# Patient Record
Sex: FEMALE | Race: WHITE | Employment: UNEMPLOYED | URBAN - METROPOLITAN AREA
[De-identification: names, ages, dates, MRNs, and addresses within clinical notes are randomized per-mention and may not be internally consistent; named-entity substitution may affect disease eponyms.]

---

## 2020-01-01 ENCOUNTER — ANESTHESIA EVENT (OUTPATIENT)
Dept: SURGERY | Age: 55
End: 2020-01-01
Payer: COMMERCIAL

## 2020-01-01 ENCOUNTER — APPOINTMENT (OUTPATIENT)
Dept: GENERAL RADIOLOGY | Age: 55
End: 2020-01-01
Attending: INTERNAL MEDICINE

## 2020-01-01 ENCOUNTER — APPOINTMENT (OUTPATIENT)
Dept: ULTRASOUND IMAGING | Age: 55
End: 2020-01-01
Attending: INTERNAL MEDICINE

## 2020-01-01 ENCOUNTER — ANESTHESIA (OUTPATIENT)
Dept: SURGERY | Age: 55
End: 2020-01-01
Payer: COMMERCIAL

## 2020-01-01 ENCOUNTER — HOSPITAL ENCOUNTER (INPATIENT)
Age: 55
LOS: 1 days | End: 2020-10-13
Attending: INTERNAL MEDICINE | Admitting: INTERNAL MEDICINE

## 2020-01-01 ENCOUNTER — HOSPITAL ENCOUNTER (OUTPATIENT)
Age: 55
Discharge: HOME | End: 2020-10-13
Attending: INTERNAL MEDICINE | Admitting: INTERNAL MEDICINE

## 2020-01-01 ENCOUNTER — HOSPITAL ENCOUNTER (OUTPATIENT)
Age: 55
Setting detail: OUTPATIENT SURGERY
Discharge: OTHER INSTITUTION | End: 2020-09-18
Attending: SURGERY | Admitting: SURGERY
Payer: COMMERCIAL

## 2020-01-01 ENCOUNTER — HOSPICE ADMISSION (OUTPATIENT)
Dept: HOSPICE | Facility: HOSPICE | Age: 55
End: 2020-01-01
Payer: MEDICARE

## 2020-01-01 ENCOUNTER — APPOINTMENT (OUTPATIENT)
Dept: CT IMAGING | Age: 55
End: 2020-01-01
Attending: INTERNAL MEDICINE

## 2020-01-01 VITALS — HEART RATE: 111 BPM | DIASTOLIC BLOOD PRESSURE: 68 MMHG | TEMPERATURE: 97.6 F | SYSTOLIC BLOOD PRESSURE: 118 MMHG

## 2020-01-01 VITALS
HEIGHT: 68 IN | SYSTOLIC BLOOD PRESSURE: 123 MMHG | TEMPERATURE: 98.2 F | RESPIRATION RATE: 18 BRPM | HEART RATE: 94 BPM | WEIGHT: 170 LBS | OXYGEN SATURATION: 100 % | DIASTOLIC BLOOD PRESSURE: 52 MMHG | BODY MASS INDEX: 25.76 KG/M2

## 2020-01-01 DIAGNOSIS — T85.71XA INFECTION ASSOCIATED WITH PERITONEAL DIALYSIS CATHETER, INITIAL ENCOUNTER (HCC): ICD-10-CM

## 2020-01-01 DIAGNOSIS — N18.6 ESRD (END STAGE RENAL DISEASE) ON DIALYSIS (HCC): ICD-10-CM

## 2020-01-01 DIAGNOSIS — Z99.2 ESRD (END STAGE RENAL DISEASE) ON DIALYSIS (HCC): ICD-10-CM

## 2020-01-01 LAB
ABO + RH BLD: NORMAL
ALBUMIN SERPL-MCNC: 0.5 G/DL (ref 3.5–5)
ALBUMIN SERPL-MCNC: 0.6 G/DL (ref 3.5–5)
ALBUMIN SERPL-MCNC: 0.8 G/DL (ref 3.5–5)
ALBUMIN SERPL-MCNC: 1 G/DL (ref 3.5–5)
ALBUMIN SERPL-MCNC: 1.2 G/DL (ref 3.5–5)
ALBUMIN SERPL-MCNC: 1.3 G/DL (ref 3.5–5)
ALBUMIN SERPL-MCNC: 1.3 G/DL (ref 3.5–5)
ALBUMIN SERPL-MCNC: 1.4 G/DL (ref 3.5–5)
ALBUMIN/GLOB SERPL: 0.1 {RATIO} (ref 1.2–3.5)
ALBUMIN/GLOB SERPL: 0.1 {RATIO} (ref 1.2–3.5)
ALBUMIN/GLOB SERPL: 0.2 {RATIO} (ref 1.2–3.5)
ALBUMIN/GLOB SERPL: 0.3 {RATIO} (ref 1.2–3.5)
ALBUMIN/GLOB SERPL: 0.3 {RATIO} (ref 1.2–3.5)
ALBUMIN/GLOB SERPL: 0.4 {RATIO} (ref 1.2–3.5)
ALP SERPL-CCNC: 150 U/L (ref 50–136)
ALP SERPL-CCNC: 154 U/L (ref 50–136)
ALP SERPL-CCNC: 164 U/L (ref 50–136)
ALP SERPL-CCNC: 167 U/L (ref 50–136)
ALP SERPL-CCNC: 220 U/L (ref 50–136)
ALP SERPL-CCNC: 75 U/L (ref 50–136)
ALP SERPL-CCNC: 84 U/L (ref 50–136)
ALP SERPL-CCNC: 95 U/L (ref 50–136)
ALT SERPL-CCNC: 10 U/L (ref 12–65)
ALT SERPL-CCNC: 11 U/L (ref 12–65)
ALT SERPL-CCNC: 11 U/L (ref 12–65)
ALT SERPL-CCNC: 13 U/L (ref 12–65)
ALT SERPL-CCNC: 13 U/L (ref 12–65)
ALT SERPL-CCNC: 20 U/L (ref 12–65)
ALT SERPL-CCNC: 21 U/L (ref 12–65)
ALT SERPL-CCNC: 7 U/L (ref 12–65)
AMORPH CRY URNS QL MICRO: ABNORMAL
ANION GAP SERPL CALC-SCNC: 12 MMOL/L (ref 7–16)
ANION GAP SERPL CALC-SCNC: 15 MMOL/L (ref 7–16)
ANION GAP SERPL CALC-SCNC: 16 MMOL/L (ref 7–16)
ANION GAP SERPL CALC-SCNC: 17 MMOL/L (ref 7–16)
ANION GAP SERPL CALC-SCNC: 18 MMOL/L (ref 7–16)
ANION GAP SERPL CALC-SCNC: 19 MMOL/L (ref 7–16)
ANION GAP SERPL CALC-SCNC: 19 MMOL/L (ref 7–16)
ANION GAP SERPL CALC-SCNC: 20 MMOL/L (ref 7–16)
ANION GAP SERPL CALC-SCNC: 21 MMOL/L (ref 7–16)
ANION GAP SERPL CALC-SCNC: 22 MMOL/L (ref 7–16)
ANION GAP SERPL CALC-SCNC: 22 MMOL/L (ref 7–16)
ANION GAP SERPL CALC-SCNC: 7 MMOL/L (ref 7–16)
APPEARANCE FLD: NORMAL
APPEARANCE UR: ABNORMAL
APTT PPP: 103.3 SEC (ref 24.7–39.8)
APTT PPP: 119.7 SEC (ref 24.3–35.4)
APTT PPP: 198.4 SEC (ref 24.3–35.4)
APTT PPP: 42.3 SEC (ref 24.3–35.4)
APTT PPP: 48.2 SEC (ref 24.3–35.4)
APTT PPP: 60.8 SEC (ref 24.3–35.4)
AST SERPL-CCNC: 14 U/L (ref 15–37)
AST SERPL-CCNC: 17 U/L (ref 15–37)
AST SERPL-CCNC: 26 U/L (ref 15–37)
AST SERPL-CCNC: 27 U/L (ref 15–37)
AST SERPL-CCNC: 28 U/L (ref 15–37)
AST SERPL-CCNC: 29 U/L (ref 15–37)
AST SERPL-CCNC: 32 U/L (ref 15–37)
AST SERPL-CCNC: 40 U/L (ref 15–37)
BACTERIA SPEC CULT: NORMAL
BACTERIA URNS QL MICRO: ABNORMAL /HPF
BASOPHILS # BLD: 0 K/UL (ref 0–0.2)
BASOPHILS # BLD: 0.1 K/UL (ref 0–0.2)
BASOPHILS # BLD: 0.1 K/UL (ref 0–0.2)
BASOPHILS NFR BLD: 0 % (ref 0–2)
BASOPHILS NFR BLD: 1 % (ref 0–2)
BILIRUB SERPL-MCNC: 0.2 MG/DL (ref 0.2–1.1)
BILIRUB SERPL-MCNC: 0.3 MG/DL (ref 0.2–1.1)
BILIRUB SERPL-MCNC: 0.4 MG/DL (ref 0.2–1.1)
BILIRUB SERPL-MCNC: 0.6 MG/DL (ref 0.2–1.1)
BILIRUB SERPL-MCNC: 0.6 MG/DL (ref 0.2–1.1)
BILIRUB UR QL: NEGATIVE
BLD PROD TYP BPU: NORMAL
BLOOD BANK CMNT PATIENT-IMP: NORMAL
BLOOD GROUP ANTIBODIES SERPL: NORMAL
BPU ID: NORMAL
BUN SERPL-MCNC: 16 MG/DL (ref 6–23)
BUN SERPL-MCNC: 17 MG/DL (ref 6–23)
BUN SERPL-MCNC: 17 MG/DL (ref 6–23)
BUN SERPL-MCNC: 25 MG/DL (ref 6–23)
BUN SERPL-MCNC: 26 MG/DL (ref 6–23)
BUN SERPL-MCNC: 28 MG/DL (ref 6–23)
BUN SERPL-MCNC: 29 MG/DL (ref 6–23)
BUN SERPL-MCNC: 32 MG/DL (ref 6–23)
BUN SERPL-MCNC: 34 MG/DL (ref 6–23)
BUN SERPL-MCNC: 40 MG/DL (ref 6–23)
BUN SERPL-MCNC: 42 MG/DL (ref 6–23)
BUN SERPL-MCNC: 42 MG/DL (ref 6–23)
BUN SERPL-MCNC: 48 MG/DL (ref 6–23)
BUN SERPL-MCNC: 5 MG/DL (ref 6–23)
BUN SERPL-MCNC: 52 MG/DL (ref 6–23)
BUN SERPL-MCNC: 53 MG/DL (ref 6–23)
BUN SERPL-MCNC: 55 MG/DL (ref 6–23)
BUN SERPL-MCNC: 56 MG/DL (ref 6–23)
BUN SERPL-MCNC: 56 MG/DL (ref 6–23)
BUN SERPL-MCNC: 68 MG/DL (ref 6–23)
CA-I BLD-MCNC: 3.8 MG/DL (ref 4–5.2)
CA-I BLD-MCNC: 4.64 MG/DL (ref 4–5.2)
CALCIUM SERPL-MCNC: 10 MG/DL (ref 8.3–10.4)
CALCIUM SERPL-MCNC: 7.1 MG/DL (ref 8.3–10.4)
CALCIUM SERPL-MCNC: 7.2 MG/DL (ref 8.3–10.4)
CALCIUM SERPL-MCNC: 8.4 MG/DL (ref 8.3–10.4)
CALCIUM SERPL-MCNC: 8.4 MG/DL (ref 8.3–10.4)
CALCIUM SERPL-MCNC: 8.6 MG/DL (ref 8.3–10.4)
CALCIUM SERPL-MCNC: 8.7 MG/DL (ref 8.3–10.4)
CALCIUM SERPL-MCNC: 8.8 MG/DL (ref 8.3–10.4)
CALCIUM SERPL-MCNC: 8.8 MG/DL (ref 8.3–10.4)
CALCIUM SERPL-MCNC: 8.9 MG/DL (ref 8.3–10.4)
CALCIUM SERPL-MCNC: 9 MG/DL (ref 8.3–10.4)
CALCIUM SERPL-MCNC: 9 MG/DL (ref 8.3–10.4)
CALCIUM SERPL-MCNC: 9.2 MG/DL (ref 8.3–10.4)
CALCIUM SERPL-MCNC: 9.2 MG/DL (ref 8.3–10.4)
CALCIUM SERPL-MCNC: 9.4 MG/DL (ref 8.3–10.4)
CALCIUM SERPL-MCNC: 9.4 MG/DL (ref 8.3–10.4)
CALCIUM SERPL-MCNC: 9.5 MG/DL (ref 8.3–10.4)
CALCIUM SERPL-MCNC: 9.8 MG/DL (ref 8.3–10.4)
CALCIUM SERPL-MCNC: 9.9 MG/DL (ref 8.3–10.4)
CALCIUM SERPL-MCNC: 9.9 MG/DL (ref 8.3–10.4)
CHLORIDE SERPL-SCNC: 100 MMOL/L (ref 98–107)
CHLORIDE SERPL-SCNC: 100 MMOL/L (ref 98–107)
CHLORIDE SERPL-SCNC: 101 MMOL/L (ref 98–107)
CHLORIDE SERPL-SCNC: 102 MMOL/L (ref 98–107)
CHLORIDE SERPL-SCNC: 103 MMOL/L (ref 98–107)
CHLORIDE SERPL-SCNC: 91 MMOL/L (ref 98–107)
CHLORIDE SERPL-SCNC: 92 MMOL/L (ref 98–107)
CHLORIDE SERPL-SCNC: 92 MMOL/L (ref 98–107)
CHLORIDE SERPL-SCNC: 93 MMOL/L (ref 98–107)
CHLORIDE SERPL-SCNC: 94 MMOL/L (ref 98–107)
CHLORIDE SERPL-SCNC: 95 MMOL/L (ref 98–107)
CHLORIDE SERPL-SCNC: 95 MMOL/L (ref 98–107)
CHLORIDE SERPL-SCNC: 96 MMOL/L (ref 98–107)
CHLORIDE SERPL-SCNC: 96 MMOL/L (ref 98–107)
CHLORIDE SERPL-SCNC: 97 MMOL/L (ref 98–107)
CHLORIDE SERPL-SCNC: 98 MMOL/L (ref 98–107)
CO2 SERPL-SCNC: 21 MMOL/L (ref 21–32)
CO2 SERPL-SCNC: 22 MMOL/L (ref 21–32)
CO2 SERPL-SCNC: 22 MMOL/L (ref 21–32)
CO2 SERPL-SCNC: 23 MMOL/L (ref 21–32)
CO2 SERPL-SCNC: 24 MMOL/L (ref 21–32)
CO2 SERPL-SCNC: 25 MMOL/L (ref 21–32)
CO2 SERPL-SCNC: 25 MMOL/L (ref 21–32)
CO2 SERPL-SCNC: 26 MMOL/L (ref 21–32)
CO2 SERPL-SCNC: 27 MMOL/L (ref 21–32)
CO2 SERPL-SCNC: 28 MMOL/L (ref 21–32)
CO2 SERPL-SCNC: 28 MMOL/L (ref 21–32)
CO2 SERPL-SCNC: 29 MMOL/L (ref 21–32)
CO2 SERPL-SCNC: 33 MMOL/L (ref 21–32)
COLOR FLD: YELLOW
COLOR UR: ABNORMAL
CREAT SERPL-MCNC: 2.41 MG/DL (ref 0.6–1)
CREAT SERPL-MCNC: 2.59 MG/DL (ref 0.6–1)
CREAT SERPL-MCNC: 2.69 MG/DL (ref 0.6–1)
CREAT SERPL-MCNC: 2.75 MG/DL (ref 0.6–1)
CREAT SERPL-MCNC: 2.79 MG/DL (ref 0.6–1)
CREAT SERPL-MCNC: 2.8 MG/DL (ref 0.6–1)
CREAT SERPL-MCNC: 2.91 MG/DL (ref 0.6–1)
CREAT SERPL-MCNC: 3.11 MG/DL (ref 0.6–1)
CREAT SERPL-MCNC: 3.14 MG/DL (ref 0.6–1)
CREAT SERPL-MCNC: 3.15 MG/DL (ref 0.6–1)
CREAT SERPL-MCNC: 3.18 MG/DL (ref 0.6–1)
CREAT SERPL-MCNC: 3.46 MG/DL (ref 0.6–1)
CREAT SERPL-MCNC: 3.62 MG/DL (ref 0.6–1)
CREAT SERPL-MCNC: 3.73 MG/DL (ref 0.6–1)
CREAT SERPL-MCNC: 3.77 MG/DL (ref 0.6–1)
CREAT SERPL-MCNC: 4.09 MG/DL (ref 0.6–1)
CREAT SERPL-MCNC: 4.46 MG/DL (ref 0.6–1)
CREAT SERPL-MCNC: 4.64 MG/DL (ref 0.6–1)
CREAT SERPL-MCNC: 4.69 MG/DL (ref 0.6–1)
CREAT SERPL-MCNC: 5.04 MG/DL (ref 0.6–1)
CREAT SERPL-MCNC: 5.43 MG/DL (ref 0.6–1)
CREAT SERPL-MCNC: 6.03 MG/DL (ref 0.6–1)
CROSSMATCH RESULT,%XM: NORMAL
DIFFERENTIAL METHOD BLD: ABNORMAL
EOSINOPHIL # BLD: 0 K/UL (ref 0–0.8)
EOSINOPHIL # BLD: 0 K/UL (ref 0–0.8)
EOSINOPHIL # BLD: 0.1 K/UL (ref 0–0.8)
EOSINOPHIL # BLD: 0.1 K/UL (ref 0–0.8)
EOSINOPHIL # BLD: 0.2 K/UL (ref 0–0.8)
EOSINOPHIL # BLD: 0.3 K/UL (ref 0–0.8)
EOSINOPHIL NFR BLD: 0 % (ref 0.5–7.8)
EOSINOPHIL NFR BLD: 0 % (ref 0.5–7.8)
EOSINOPHIL NFR BLD: 1 % (ref 0.5–7.8)
EOSINOPHIL NFR BLD: 2 % (ref 0.5–7.8)
EPI CELLS #/AREA URNS HPF: ABNORMAL /HPF
ERYTHROCYTE [DISTWIDTH] IN BLOOD BY AUTOMATED COUNT: 15.2 % (ref 11.9–14.6)
ERYTHROCYTE [DISTWIDTH] IN BLOOD BY AUTOMATED COUNT: 15.3 % (ref 11.9–14.6)
ERYTHROCYTE [DISTWIDTH] IN BLOOD BY AUTOMATED COUNT: 15.5 % (ref 11.9–14.6)
ERYTHROCYTE [DISTWIDTH] IN BLOOD BY AUTOMATED COUNT: 15.6 % (ref 11.9–14.6)
ERYTHROCYTE [DISTWIDTH] IN BLOOD BY AUTOMATED COUNT: 15.7 % (ref 11.9–14.6)
ERYTHROCYTE [DISTWIDTH] IN BLOOD BY AUTOMATED COUNT: 15.7 % (ref 11.9–14.6)
ERYTHROCYTE [DISTWIDTH] IN BLOOD BY AUTOMATED COUNT: 15.8 % (ref 11.9–14.6)
ERYTHROCYTE [DISTWIDTH] IN BLOOD BY AUTOMATED COUNT: 15.9 % (ref 11.9–14.6)
ERYTHROCYTE [DISTWIDTH] IN BLOOD BY AUTOMATED COUNT: 16 % (ref 11.9–14.6)
ERYTHROCYTE [DISTWIDTH] IN BLOOD BY AUTOMATED COUNT: 16.1 % (ref 11.9–14.6)
ERYTHROCYTE [DISTWIDTH] IN BLOOD BY AUTOMATED COUNT: 16.2 % (ref 11.9–14.6)
FOLATE SERPL-MCNC: 20 NG/ML (ref 3.1–17.5)
GLOBULIN SER CALC-MCNC: 3.8 G/DL (ref 2.3–3.5)
GLOBULIN SER CALC-MCNC: 4 G/DL (ref 2.3–3.5)
GLOBULIN SER CALC-MCNC: 4 G/DL (ref 2.3–3.5)
GLOBULIN SER CALC-MCNC: 4.2 G/DL (ref 2.3–3.5)
GLOBULIN SER CALC-MCNC: 4.4 G/DL (ref 2.3–3.5)
GLOBULIN SER CALC-MCNC: 4.6 G/DL (ref 2.3–3.5)
GLOBULIN SER CALC-MCNC: 4.8 G/DL (ref 2.3–3.5)
GLOBULIN SER CALC-MCNC: 4.8 G/DL (ref 2.3–3.5)
GLUCOSE SERPL-MCNC: 132 MG/DL (ref 65–100)
GLUCOSE SERPL-MCNC: 134 MG/DL (ref 65–100)
GLUCOSE SERPL-MCNC: 148 MG/DL (ref 65–100)
GLUCOSE SERPL-MCNC: 153 MG/DL (ref 65–100)
GLUCOSE SERPL-MCNC: 170 MG/DL (ref 65–100)
GLUCOSE SERPL-MCNC: 175 MG/DL (ref 65–100)
GLUCOSE SERPL-MCNC: 176 MG/DL (ref 65–100)
GLUCOSE SERPL-MCNC: 181 MG/DL (ref 65–100)
GLUCOSE SERPL-MCNC: 182 MG/DL (ref 65–100)
GLUCOSE SERPL-MCNC: 199 MG/DL (ref 65–100)
GLUCOSE SERPL-MCNC: 199 MG/DL (ref 65–100)
GLUCOSE SERPL-MCNC: 204 MG/DL (ref 65–100)
GLUCOSE SERPL-MCNC: 214 MG/DL (ref 65–100)
GLUCOSE SERPL-MCNC: 217 MG/DL (ref 65–100)
GLUCOSE SERPL-MCNC: 238 MG/DL (ref 65–100)
GLUCOSE SERPL-MCNC: 242 MG/DL (ref 65–100)
GLUCOSE SERPL-MCNC: 262 MG/DL (ref 65–100)
GLUCOSE SERPL-MCNC: 280 MG/DL (ref 65–100)
GLUCOSE SERPL-MCNC: 63 MG/DL (ref 65–100)
GLUCOSE SERPL-MCNC: 70 MG/DL (ref 65–100)
GLUCOSE SERPL-MCNC: 72 MG/DL (ref 65–100)
GLUCOSE SERPL-MCNC: 73 MG/DL (ref 65–100)
GLUCOSE UR STRIP.AUTO-MCNC: NEGATIVE MG/DL
GRAM STN SPEC: NORMAL
GRAM STN SPEC: NORMAL
HAV IGM SER QL: NONREACTIVE
HBV CORE AB SERPL QL IA: NEGATIVE
HBV CORE IGM SER QL: NONREACTIVE
HBV SURFACE AB SERPL IA-ACNC: 23.82 MIU/ML
HBV SURFACE AB SERPL IA-ACNC: 56.06 MIU/ML
HBV SURFACE AG SER QL: NONREACTIVE
HCT VFR BLD AUTO: 17.9 % (ref 35.8–46.3)
HCT VFR BLD AUTO: 19.4 % (ref 35.8–46.3)
HCT VFR BLD AUTO: 19.4 % (ref 35.8–46.3)
HCT VFR BLD AUTO: 19.7 % (ref 35.8–46.3)
HCT VFR BLD AUTO: 21.1 % (ref 35.8–46.3)
HCT VFR BLD AUTO: 21.1 % (ref 35.8–46.3)
HCT VFR BLD AUTO: 21.5 % (ref 35.8–46.3)
HCT VFR BLD AUTO: 22 % (ref 35.8–46.3)
HCT VFR BLD AUTO: 22 % (ref 35.8–46.3)
HCT VFR BLD AUTO: 22.2 % (ref 35.8–46.3)
HCT VFR BLD AUTO: 22.4 % (ref 35.8–46.3)
HCT VFR BLD AUTO: 22.7 % (ref 35.8–46.3)
HCT VFR BLD AUTO: 22.7 % (ref 35.8–46.3)
HCT VFR BLD AUTO: 22.8 % (ref 35.8–46.3)
HCT VFR BLD AUTO: 23 % (ref 35.8–46.3)
HCT VFR BLD AUTO: 23.1 % (ref 35.8–46.3)
HCT VFR BLD AUTO: 23.2 % (ref 35.8–46.3)
HCT VFR BLD AUTO: 23.2 % (ref 35.8–46.3)
HCT VFR BLD AUTO: 23.5 % (ref 35.8–46.3)
HCT VFR BLD AUTO: 23.7 % (ref 35.8–46.3)
HCT VFR BLD AUTO: 23.8 % (ref 35.8–46.3)
HCT VFR BLD AUTO: 23.9 % (ref 35.8–46.3)
HCT VFR BLD AUTO: 24.2 % (ref 35.8–46.3)
HCT VFR BLD AUTO: 24.5 % (ref 35.8–46.3)
HCT VFR BLD AUTO: 24.5 % (ref 35.8–46.3)
HCT VFR BLD AUTO: 25.1 % (ref 35.8–46.3)
HCT VFR BLD AUTO: 25.2 % (ref 35.8–46.3)
HCT VFR BLD AUTO: 25.2 % (ref 35.8–46.3)
HCT VFR BLD AUTO: 25.5 % (ref 35.8–46.3)
HCT VFR BLD AUTO: 25.6 % (ref 35.8–46.3)
HCT VFR BLD AUTO: 26.1 % (ref 35.8–46.3)
HCT VFR BLD AUTO: 27.6 % (ref 35.8–46.3)
HCT VFR BLD AUTO: 27.9 % (ref 35.8–46.3)
HCT VFR BLD AUTO: 30.1 % (ref 35.8–46.3)
HCV AB SER QL: NONREACTIVE
HEMOCCULT STL QL: NEGATIVE
HGB BLD-MCNC: 10.2 G/DL (ref 11.7–15.4)
HGB BLD-MCNC: 5.9 G/DL (ref 11.7–15.4)
HGB BLD-MCNC: 6.5 G/DL (ref 11.7–15.4)
HGB BLD-MCNC: 6.5 G/DL (ref 11.7–15.4)
HGB BLD-MCNC: 6.6 G/DL (ref 11.7–15.4)
HGB BLD-MCNC: 6.8 G/DL (ref 11.7–15.4)
HGB BLD-MCNC: 7 G/DL (ref 11.7–15.4)
HGB BLD-MCNC: 7.1 G/DL (ref 11.7–15.4)
HGB BLD-MCNC: 7.2 G/DL (ref 11.7–15.4)
HGB BLD-MCNC: 7.5 G/DL (ref 11.7–15.4)
HGB BLD-MCNC: 7.6 G/DL (ref 11.7–15.4)
HGB BLD-MCNC: 7.7 G/DL (ref 11.7–15.4)
HGB BLD-MCNC: 7.8 G/DL (ref 11.7–15.4)
HGB BLD-MCNC: 7.9 G/DL (ref 11.7–15.4)
HGB BLD-MCNC: 8 G/DL (ref 11.7–15.4)
HGB BLD-MCNC: 8 G/DL (ref 11.7–15.4)
HGB BLD-MCNC: 8.1 G/DL (ref 11.7–15.4)
HGB BLD-MCNC: 8.1 G/DL (ref 11.7–15.4)
HGB BLD-MCNC: 8.2 G/DL (ref 11.7–15.4)
HGB BLD-MCNC: 8.3 G/DL (ref 11.7–15.4)
HGB BLD-MCNC: 8.3 G/DL (ref 11.7–15.4)
HGB BLD-MCNC: 8.4 G/DL (ref 11.7–15.4)
HGB BLD-MCNC: 8.5 G/DL (ref 11.7–15.4)
HGB BLD-MCNC: 8.7 G/DL (ref 11.7–15.4)
HGB BLD-MCNC: 9 G/DL (ref 11.7–15.4)
HGB BLD-MCNC: 9.1 G/DL (ref 11.7–15.4)
HGB UR QL STRIP: ABNORMAL
HISTORY CHECKED?,CKHIST: NORMAL
IMM GRANULOCYTES # BLD AUTO: 0.1 K/UL (ref 0–0.5)
IMM GRANULOCYTES # BLD AUTO: 0.2 K/UL (ref 0–0.5)
IMM GRANULOCYTES # BLD AUTO: 0.3 K/UL (ref 0–0.5)
IMM GRANULOCYTES # BLD AUTO: 0.5 K/UL (ref 0–0.5)
IMM GRANULOCYTES # BLD AUTO: 0.6 K/UL (ref 0–0.5)
IMM GRANULOCYTES NFR BLD AUTO: 1 % (ref 0–5)
IMM GRANULOCYTES NFR BLD AUTO: 2 % (ref 0–5)
IMM GRANULOCYTES NFR BLD AUTO: 2 % (ref 0–5)
IMM GRANULOCYTES NFR BLD AUTO: 3 % (ref 0–5)
INR PPP: 1.2
INR PPP: 1.3
INR PPP: 1.5
INR PPP: 1.6
INR PPP: 1.7
INR PPP: 1.8
INR PPP: 1.9
INR PPP: 1.9
INR PPP: 2.2
KETONES UR QL STRIP.AUTO: NEGATIVE MG/DL
LACTATE SERPL-SCNC: 1.2 MMOL/L (ref 0.4–2)
LEUKOCYTE ESTERASE UR QL STRIP.AUTO: ABNORMAL
LYMPHOCYTES # BLD: 0.7 K/UL (ref 0.5–4.6)
LYMPHOCYTES # BLD: 1 K/UL (ref 0.5–4.6)
LYMPHOCYTES # BLD: 1.1 K/UL (ref 0.5–4.6)
LYMPHOCYTES # BLD: 1.1 K/UL (ref 0.5–4.6)
LYMPHOCYTES # BLD: 1.2 K/UL (ref 0.5–4.6)
LYMPHOCYTES # BLD: 1.2 K/UL (ref 0.5–4.6)
LYMPHOCYTES # BLD: 1.3 K/UL (ref 0.5–4.6)
LYMPHOCYTES # BLD: 1.4 K/UL (ref 0.5–4.6)
LYMPHOCYTES # BLD: 1.4 K/UL (ref 0.5–4.6)
LYMPHOCYTES # BLD: 1.5 K/UL (ref 0.5–4.6)
LYMPHOCYTES NFR BLD: 10 % (ref 13–44)
LYMPHOCYTES NFR BLD: 13 % (ref 13–44)
LYMPHOCYTES NFR BLD: 13 % (ref 13–44)
LYMPHOCYTES NFR BLD: 3 % (ref 13–44)
LYMPHOCYTES NFR BLD: 5 % (ref 13–44)
LYMPHOCYTES NFR BLD: 6 % (ref 13–44)
LYMPHOCYTES NFR BLD: 7 % (ref 13–44)
LYMPHOCYTES NFR BLD: 7 % (ref 13–44)
LYMPHOCYTES NFR BLD: 8 % (ref 13–44)
LYMPHOCYTES NFR BLD: 9 % (ref 13–44)
MAGNESIUM SERPL-MCNC: 1.7 MG/DL (ref 1.8–2.4)
MAGNESIUM SERPL-MCNC: 1.8 MG/DL (ref 1.8–2.4)
MAGNESIUM SERPL-MCNC: 1.9 MG/DL (ref 1.8–2.4)
MAGNESIUM SERPL-MCNC: 2.1 MG/DL (ref 1.8–2.4)
MAGNESIUM SERPL-MCNC: 2.2 MG/DL (ref 1.8–2.4)
MCH RBC QN AUTO: 29.7 PG (ref 26.1–32.9)
MCH RBC QN AUTO: 29.9 PG (ref 26.1–32.9)
MCH RBC QN AUTO: 30.3 PG (ref 26.1–32.9)
MCH RBC QN AUTO: 30.3 PG (ref 26.1–32.9)
MCH RBC QN AUTO: 30.4 PG (ref 26.1–32.9)
MCH RBC QN AUTO: 30.4 PG (ref 26.1–32.9)
MCH RBC QN AUTO: 30.5 PG (ref 26.1–32.9)
MCH RBC QN AUTO: 30.6 PG (ref 26.1–32.9)
MCH RBC QN AUTO: 30.7 PG (ref 26.1–32.9)
MCH RBC QN AUTO: 30.8 PG (ref 26.1–32.9)
MCH RBC QN AUTO: 30.9 PG (ref 26.1–32.9)
MCH RBC QN AUTO: 30.9 PG (ref 26.1–32.9)
MCH RBC QN AUTO: 31 PG (ref 26.1–32.9)
MCH RBC QN AUTO: 31.1 PG (ref 26.1–32.9)
MCH RBC QN AUTO: 31.2 PG (ref 26.1–32.9)
MCH RBC QN AUTO: 31.4 PG (ref 26.1–32.9)
MCH RBC QN AUTO: 31.5 PG (ref 26.1–32.9)
MCHC RBC AUTO-ENTMCNC: 30.6 G/DL (ref 31.4–35)
MCHC RBC AUTO-ENTMCNC: 31 G/DL (ref 31.4–35)
MCHC RBC AUTO-ENTMCNC: 31.3 G/DL (ref 31.4–35)
MCHC RBC AUTO-ENTMCNC: 31.5 G/DL (ref 31.4–35)
MCHC RBC AUTO-ENTMCNC: 31.7 G/DL (ref 31.4–35)
MCHC RBC AUTO-ENTMCNC: 31.8 G/DL (ref 31.4–35)
MCHC RBC AUTO-ENTMCNC: 31.9 G/DL (ref 31.4–35)
MCHC RBC AUTO-ENTMCNC: 32.7 G/DL (ref 31.4–35)
MCHC RBC AUTO-ENTMCNC: 32.7 G/DL (ref 31.4–35)
MCHC RBC AUTO-ENTMCNC: 32.8 G/DL (ref 31.4–35)
MCHC RBC AUTO-ENTMCNC: 32.9 G/DL (ref 31.4–35)
MCHC RBC AUTO-ENTMCNC: 33 G/DL (ref 31.4–35)
MCHC RBC AUTO-ENTMCNC: 33 G/DL (ref 31.4–35)
MCHC RBC AUTO-ENTMCNC: 33.2 G/DL (ref 31.4–35)
MCHC RBC AUTO-ENTMCNC: 33.3 G/DL (ref 31.4–35)
MCHC RBC AUTO-ENTMCNC: 33.5 G/DL (ref 31.4–35)
MCHC RBC AUTO-ENTMCNC: 33.8 G/DL (ref 31.4–35)
MCHC RBC AUTO-ENTMCNC: 33.8 G/DL (ref 31.4–35)
MCHC RBC AUTO-ENTMCNC: 33.9 G/DL (ref 31.4–35)
MCHC RBC AUTO-ENTMCNC: 34 G/DL (ref 31.4–35)
MCHC RBC AUTO-ENTMCNC: 34.1 G/DL (ref 31.4–35)
MCHC RBC AUTO-ENTMCNC: 34.1 G/DL (ref 31.4–35)
MCHC RBC AUTO-ENTMCNC: 34.3 G/DL (ref 31.4–35)
MCHC RBC AUTO-ENTMCNC: 34.6 G/DL (ref 31.4–35)
MCHC RBC AUTO-ENTMCNC: 34.9 G/DL (ref 31.4–35)
MCV RBC AUTO: 89.2 FL (ref 79.6–97.8)
MCV RBC AUTO: 89.4 FL (ref 79.6–97.8)
MCV RBC AUTO: 89.4 FL (ref 79.6–97.8)
MCV RBC AUTO: 89.8 FL (ref 79.6–97.8)
MCV RBC AUTO: 90.2 FL (ref 79.6–97.8)
MCV RBC AUTO: 90.3 FL (ref 79.6–97.8)
MCV RBC AUTO: 90.5 FL (ref 79.6–97.8)
MCV RBC AUTO: 90.9 FL (ref 79.6–97.8)
MCV RBC AUTO: 91.5 FL (ref 79.6–97.8)
MCV RBC AUTO: 91.7 FL (ref 79.6–97.8)
MCV RBC AUTO: 92.3 FL (ref 79.6–97.8)
MCV RBC AUTO: 92.4 FL (ref 79.6–97.8)
MCV RBC AUTO: 92.5 FL (ref 79.6–97.8)
MCV RBC AUTO: 92.5 FL (ref 79.6–97.8)
MCV RBC AUTO: 92.8 FL (ref 79.6–97.8)
MCV RBC AUTO: 92.8 FL (ref 79.6–97.8)
MCV RBC AUTO: 93.2 FL (ref 79.6–97.8)
MCV RBC AUTO: 93.3 FL (ref 79.6–97.8)
MCV RBC AUTO: 93.9 FL (ref 79.6–97.8)
MCV RBC AUTO: 95.7 FL (ref 79.6–97.8)
MCV RBC AUTO: 97.2 FL (ref 79.6–97.8)
MCV RBC AUTO: 97.7 FL (ref 79.6–97.8)
MCV RBC AUTO: 98.5 FL (ref 79.6–97.8)
MCV RBC AUTO: 99.6 FL (ref 79.6–97.8)
MCV RBC AUTO: 99.6 FL (ref 79.6–97.8)
MONOCYTES # BLD: 0.6 K/UL (ref 0.1–1.3)
MONOCYTES # BLD: 0.7 K/UL (ref 0.1–1.3)
MONOCYTES # BLD: 0.7 K/UL (ref 0.1–1.3)
MONOCYTES # BLD: 0.8 K/UL (ref 0.1–1.3)
MONOCYTES # BLD: 0.8 K/UL (ref 0.1–1.3)
MONOCYTES # BLD: 0.9 K/UL (ref 0.1–1.3)
MONOCYTES # BLD: 1 K/UL (ref 0.1–1.3)
MONOCYTES # BLD: 1.1 K/UL (ref 0.1–1.3)
MONOCYTES # BLD: 1.1 K/UL (ref 0.1–1.3)
MONOCYTES # BLD: 1.2 K/UL (ref 0.1–1.3)
MONOCYTES # BLD: 1.3 K/UL (ref 0.1–1.3)
MONOCYTES # BLD: 1.4 K/UL (ref 0.1–1.3)
MONOCYTES NFR BLD: 10 % (ref 4–12)
MONOCYTES NFR BLD: 4 % (ref 4–12)
MONOCYTES NFR BLD: 5 % (ref 4–12)
MONOCYTES NFR BLD: 6 % (ref 4–12)
MONOCYTES NFR BLD: 6 % (ref 4–12)
MONOCYTES NFR BLD: 7 % (ref 4–12)
MONOCYTES NFR BLD: 7 % (ref 4–12)
MONOCYTES NFR BLD: 8 % (ref 4–12)
MONOCYTES NFR BLD: 9 % (ref 4–12)
NEUTROPHILS NFR BRONCH MANUAL: 100 %
NEUTS SEG # BLD: 10.3 K/UL (ref 1.7–8.2)
NEUTS SEG # BLD: 10.3 K/UL (ref 1.7–8.2)
NEUTS SEG # BLD: 10.6 K/UL (ref 1.7–8.2)
NEUTS SEG # BLD: 10.7 K/UL (ref 1.7–8.2)
NEUTS SEG # BLD: 11.3 K/UL (ref 1.7–8.2)
NEUTS SEG # BLD: 15 K/UL (ref 1.7–8.2)
NEUTS SEG # BLD: 16.1 K/UL (ref 1.7–8.2)
NEUTS SEG # BLD: 16.2 K/UL (ref 1.7–8.2)
NEUTS SEG # BLD: 18.9 K/UL (ref 1.7–8.2)
NEUTS SEG # BLD: 19.8 K/UL (ref 1.7–8.2)
NEUTS SEG # BLD: 20.9 K/UL (ref 1.7–8.2)
NEUTS SEG # BLD: 21 K/UL (ref 1.7–8.2)
NEUTS SEG # BLD: 8.6 K/UL (ref 1.7–8.2)
NEUTS SEG # BLD: 8.8 K/UL (ref 1.7–8.2)
NEUTS SEG NFR BLD: 77 % (ref 43–78)
NEUTS SEG NFR BLD: 78 % (ref 43–78)
NEUTS SEG NFR BLD: 78 % (ref 43–78)
NEUTS SEG NFR BLD: 79 % (ref 43–78)
NEUTS SEG NFR BLD: 82 % (ref 43–78)
NEUTS SEG NFR BLD: 82 % (ref 43–78)
NEUTS SEG NFR BLD: 84 % (ref 43–78)
NEUTS SEG NFR BLD: 85 % (ref 43–78)
NEUTS SEG NFR BLD: 86 % (ref 43–78)
NEUTS SEG NFR BLD: 87 % (ref 43–78)
NEUTS SEG NFR BLD: 88 % (ref 43–78)
NEUTS SEG NFR BLD: 88 % (ref 43–78)
NEUTS SEG NFR BLD: 89 % (ref 43–78)
NEUTS SEG NFR BLD: 90 % (ref 43–78)
NITRITE UR QL STRIP.AUTO: NEGATIVE
NRBC # BLD: 0 K/UL (ref 0–0.2)
NUC CELL # FLD: 7188 /CU MM
OTHER OBSERVATIONS,UCOM: ABNORMAL
PH UR STRIP: 7.5 [PH] (ref 5–9)
PHOSPHATE SERPL-MCNC: 0.6 MG/DL (ref 2.5–4.5)
PHOSPHATE SERPL-MCNC: 0.6 MG/DL (ref 2.5–4.5)
PHOSPHATE SERPL-MCNC: 0.8 MG/DL (ref 2.5–4.5)
PHOSPHATE SERPL-MCNC: 1.1 MG/DL (ref 2.5–4.5)
PHOSPHATE SERPL-MCNC: 2 MG/DL (ref 2.5–4.5)
PHOSPHATE SERPL-MCNC: 2.2 MG/DL (ref 2.5–4.5)
PHOSPHATE SERPL-MCNC: 2.3 MG/DL (ref 2.5–4.5)
PHOSPHATE SERPL-MCNC: 2.3 MG/DL (ref 2.5–4.5)
PHOSPHATE SERPL-MCNC: 2.5 MG/DL (ref 2.5–4.5)
PHOSPHATE SERPL-MCNC: 2.7 MG/DL (ref 2.5–4.5)
PHOSPHATE SERPL-MCNC: 2.9 MG/DL (ref 2.5–4.5)
PHOSPHATE SERPL-MCNC: 3 MG/DL (ref 2.5–4.5)
PHOSPHATE SERPL-MCNC: 3.8 MG/DL (ref 2.5–4.5)
PHOSPHATE SERPL-MCNC: 4.8 MG/DL (ref 2.5–4.5)
PLATELET # BLD AUTO: 149 K/UL (ref 150–450)
PLATELET # BLD AUTO: 160 K/UL (ref 150–450)
PLATELET # BLD AUTO: 162 K/UL (ref 150–450)
PLATELET # BLD AUTO: 166 K/UL (ref 150–450)
PLATELET # BLD AUTO: 169 K/UL (ref 150–450)
PLATELET # BLD AUTO: 185 K/UL (ref 150–450)
PLATELET # BLD AUTO: 189 K/UL (ref 150–450)
PLATELET # BLD AUTO: 190 K/UL (ref 150–450)
PLATELET # BLD AUTO: 206 K/UL (ref 150–450)
PLATELET # BLD AUTO: 218 K/UL (ref 150–450)
PLATELET # BLD AUTO: 221 K/UL (ref 150–450)
PLATELET # BLD AUTO: 228 K/UL (ref 150–450)
PLATELET # BLD AUTO: 233 K/UL (ref 150–450)
PLATELET # BLD AUTO: 234 K/UL (ref 150–450)
PLATELET # BLD AUTO: 239 K/UL (ref 150–450)
PLATELET # BLD AUTO: 245 K/UL (ref 150–450)
PLATELET # BLD AUTO: 257 K/UL (ref 150–450)
PLATELET # BLD AUTO: 271 K/UL (ref 150–450)
PLATELET # BLD AUTO: 282 K/UL (ref 150–450)
PLATELET # BLD AUTO: 291 K/UL (ref 150–450)
PLATELET # BLD AUTO: 297 K/UL (ref 150–450)
PLATELET # BLD AUTO: 301 K/UL (ref 150–450)
PLATELET # BLD AUTO: 312 K/UL (ref 150–450)
PLATELET # BLD AUTO: 317 K/UL (ref 150–450)
PLATELET # BLD AUTO: 333 K/UL (ref 150–450)
PMV BLD AUTO: 10.1 FL (ref 9.4–12.3)
PMV BLD AUTO: 10.1 FL (ref 9.4–12.3)
PMV BLD AUTO: 10.2 FL (ref 9.4–12.3)
PMV BLD AUTO: 10.2 FL (ref 9.4–12.3)
PMV BLD AUTO: 10.3 FL (ref 9.4–12.3)
PMV BLD AUTO: 10.3 FL (ref 9.4–12.3)
PMV BLD AUTO: 10.4 FL (ref 9.4–12.3)
PMV BLD AUTO: 10.5 FL (ref 9.4–12.3)
PMV BLD AUTO: 10.5 FL (ref 9.4–12.3)
PMV BLD AUTO: 10.6 FL (ref 9.4–12.3)
PMV BLD AUTO: 10.7 FL (ref 9.4–12.3)
PMV BLD AUTO: 10.8 FL (ref 9.4–12.3)
PMV BLD AUTO: 11 FL (ref 9.4–12.3)
PMV BLD AUTO: 11.1 FL (ref 9.4–12.3)
PMV BLD AUTO: 11.1 FL (ref 9.4–12.3)
PMV BLD AUTO: 9.7 FL (ref 9.4–12.3)
POTASSIUM SERPL-SCNC: 3.7 MMOL/L (ref 3.5–5.1)
POTASSIUM SERPL-SCNC: 3.7 MMOL/L (ref 3.5–5.1)
POTASSIUM SERPL-SCNC: 3.8 MMOL/L (ref 3.5–5.1)
POTASSIUM SERPL-SCNC: 3.8 MMOL/L (ref 3.5–5.1)
POTASSIUM SERPL-SCNC: 3.9 MMOL/L (ref 3.5–5.1)
POTASSIUM SERPL-SCNC: 4 MMOL/L (ref 3.5–5.1)
POTASSIUM SERPL-SCNC: 4 MMOL/L (ref 3.5–5.1)
POTASSIUM SERPL-SCNC: 4.1 MMOL/L (ref 3.5–5.1)
POTASSIUM SERPL-SCNC: 4.1 MMOL/L (ref 3.5–5.1)
POTASSIUM SERPL-SCNC: 4.2 MMOL/L (ref 3.5–5.1)
POTASSIUM SERPL-SCNC: 4.3 MMOL/L (ref 3.5–5.1)
POTASSIUM SERPL-SCNC: 4.4 MMOL/L (ref 3.5–5.1)
POTASSIUM SERPL-SCNC: 4.5 MMOL/L (ref 3.5–5.1)
POTASSIUM SERPL-SCNC: 4.5 MMOL/L (ref 3.5–5.1)
POTASSIUM SERPL-SCNC: 4.6 MMOL/L (ref 3.5–5.1)
POTASSIUM SERPL-SCNC: 4.7 MMOL/L (ref 3.5–5.1)
POTASSIUM SERPL-SCNC: 5.1 MMOL/L (ref 3.5–5.1)
PROCALCITONIN SERPL-MCNC: 2.18 NG/ML
PROT SERPL-MCNC: 4.4 G/DL (ref 6.3–8.2)
PROT SERPL-MCNC: 4.6 G/DL (ref 6.3–8.2)
PROT SERPL-MCNC: 5.1 G/DL (ref 6.3–8.2)
PROT SERPL-MCNC: 5.4 G/DL (ref 6.3–8.2)
PROT SERPL-MCNC: 5.4 G/DL (ref 6.3–8.2)
PROT SERPL-MCNC: 5.5 G/DL (ref 6.3–8.2)
PROT SERPL-MCNC: 5.6 G/DL (ref 6.3–8.2)
PROT SERPL-MCNC: 5.6 G/DL (ref 6.3–8.2)
PROT UR STRIP-MCNC: >1000 MG/DL
PROTHROMBIN TIME: 15.3 SEC (ref 12–14.7)
PROTHROMBIN TIME: 16.9 SEC (ref 12–14.7)
PROTHROMBIN TIME: 18.3 SEC (ref 12–14.7)
PROTHROMBIN TIME: 18.4 SEC (ref 12–14.7)
PROTHROMBIN TIME: 18.9 SEC (ref 12–14.7)
PROTHROMBIN TIME: 18.9 SEC (ref 12–14.7)
PROTHROMBIN TIME: 19.3 SEC (ref 12–14.7)
PROTHROMBIN TIME: 20 SEC (ref 12–14.7)
PROTHROMBIN TIME: 20.9 SEC (ref 12–14.7)
PROTHROMBIN TIME: 21 SEC (ref 12–14.7)
PROTHROMBIN TIME: 21.7 SEC (ref 12–14.7)
PROTHROMBIN TIME: 21.9 SEC (ref 12–14.7)
PROTHROMBIN TIME: 21.9 SEC (ref 12–14.7)
PROTHROMBIN TIME: 24.9 SEC (ref 12–14.7)
PTH-INTACT SERPL-MCNC: 183.2 PG/ML (ref 18.5–88)
PTH-INTACT SERPL-MCNC: 187.1 PG/ML (ref 18.5–88)
RBC # BLD AUTO: 1.92 M/UL (ref 4.05–5.2)
RBC # BLD AUTO: 2.12 M/UL (ref 4.05–5.2)
RBC # BLD AUTO: 2.13 M/UL (ref 4.05–5.2)
RBC # BLD AUTO: 2.23 M/UL (ref 4.05–5.2)
RBC # BLD AUTO: 2.28 M/UL (ref 4.05–5.2)
RBC # BLD AUTO: 2.34 M/UL (ref 4.05–5.2)
RBC # BLD AUTO: 2.35 M/UL (ref 4.05–5.2)
RBC # BLD AUTO: 2.37 M/UL (ref 4.05–5.2)
RBC # BLD AUTO: 2.41 M/UL (ref 4.05–5.2)
RBC # BLD AUTO: 2.42 M/UL (ref 4.05–5.2)
RBC # BLD AUTO: 2.47 M/UL (ref 4.05–5.2)
RBC # BLD AUTO: 2.49 M/UL (ref 4.05–5.2)
RBC # BLD AUTO: 2.5 M/UL (ref 4.05–5.2)
RBC # BLD AUTO: 2.52 M/UL (ref 4.05–5.2)
RBC # BLD AUTO: 2.52 M/UL (ref 4.05–5.2)
RBC # BLD AUTO: 2.58 M/UL (ref 4.05–5.2)
RBC # BLD AUTO: 2.59 M/UL (ref 4.05–5.2)
RBC # BLD AUTO: 2.64 M/UL (ref 4.05–5.2)
RBC # BLD AUTO: 2.65 M/UL (ref 4.05–5.2)
RBC # BLD AUTO: 2.65 M/UL (ref 4.05–5.2)
RBC # BLD AUTO: 2.69 M/UL (ref 4.05–5.2)
RBC # BLD AUTO: 2.73 M/UL (ref 4.05–5.2)
RBC # BLD AUTO: 2.74 M/UL (ref 4.05–5.2)
RBC # BLD AUTO: 2.77 M/UL (ref 4.05–5.2)
RBC # BLD AUTO: 2.89 M/UL (ref 4.05–5.2)
RBC # FLD: 1000 /CU MM
RBC #/AREA URNS HPF: ABNORMAL /HPF
SERVICE CMNT-IMP: NORMAL
SODIUM SERPL-SCNC: 133 MMOL/L (ref 136–145)
SODIUM SERPL-SCNC: 135 MMOL/L (ref 136–145)
SODIUM SERPL-SCNC: 135 MMOL/L (ref 136–145)
SODIUM SERPL-SCNC: 137 MMOL/L (ref 136–145)
SODIUM SERPL-SCNC: 138 MMOL/L (ref 136–145)
SODIUM SERPL-SCNC: 139 MMOL/L (ref 136–145)
SODIUM SERPL-SCNC: 140 MMOL/L (ref 136–145)
SODIUM SERPL-SCNC: 141 MMOL/L (ref 136–145)
SODIUM SERPL-SCNC: 141 MMOL/L (ref 136–145)
SODIUM SERPL-SCNC: 143 MMOL/L (ref 136–145)
SODIUM SERPL-SCNC: 144 MMOL/L (ref 136–145)
SODIUM SERPL-SCNC: 145 MMOL/L (ref 136–145)
SP GR UR REFRACTOMETRY: 1.02 (ref 1–1.02)
SPECIMEN EXP DATE BLD: NORMAL
SPECIMEN SOURCE FLD: NORMAL
STATUS OF UNIT,%ST: NORMAL
T4 SERPL-MCNC: 6.6 UG/DL (ref 4.8–13.9)
TSH SERPL DL<=0.005 MIU/L-ACNC: 3.32 UIU/ML (ref 0.36–3.74)
UNIT DIVISION, %UDIV: 0
UROBILINOGEN UR QL STRIP.AUTO: 0.2 EU/DL (ref 0.2–1)
VANCOMYCIN SERPL-MCNC: 14.9 UG/ML
VANCOMYCIN SERPL-MCNC: 15.5 UG/ML
VANCOMYCIN SERPL-MCNC: 15.7 UG/ML
VANCOMYCIN SERPL-MCNC: 16.2 UG/ML
VANCOMYCIN SERPL-MCNC: 16.3 UG/ML
VANCOMYCIN SERPL-MCNC: 16.6 UG/ML
VANCOMYCIN SERPL-MCNC: 19.2 UG/ML
VANCOMYCIN SERPL-MCNC: 23.6 UG/ML
VANCOMYCIN SERPL-MCNC: 24.4 UG/ML
VANCOMYCIN SERPL-MCNC: 8.6 UG/ML
VIT B12 SERPL-MCNC: 1361 PG/ML (ref 193–986)
WBC # BLD AUTO: 10.9 K/UL (ref 4.3–11.1)
WBC # BLD AUTO: 11.4 K/UL (ref 4.3–11.1)
WBC # BLD AUTO: 11.9 K/UL (ref 4.3–11.1)
WBC # BLD AUTO: 12 K/UL (ref 4.3–11.1)
WBC # BLD AUTO: 12.2 K/UL (ref 4.3–11.1)
WBC # BLD AUTO: 12.5 K/UL (ref 4.3–11.1)
WBC # BLD AUTO: 12.9 K/UL (ref 4.3–11.1)
WBC # BLD AUTO: 13.5 K/UL (ref 4.3–11.1)
WBC # BLD AUTO: 13.6 K/UL (ref 4.3–11.1)
WBC # BLD AUTO: 13.8 K/UL (ref 4.3–11.1)
WBC # BLD AUTO: 16.1 K/UL (ref 4.3–11.1)
WBC # BLD AUTO: 17.2 K/UL (ref 4.3–11.1)
WBC # BLD AUTO: 17.5 K/UL (ref 4.3–11.1)
WBC # BLD AUTO: 18.2 K/UL (ref 4.3–11.1)
WBC # BLD AUTO: 18.6 K/UL (ref 4.3–11.1)
WBC # BLD AUTO: 19 K/UL (ref 4.3–11.1)
WBC # BLD AUTO: 19.8 K/UL (ref 4.3–11.1)
WBC # BLD AUTO: 21.8 K/UL (ref 4.3–11.1)
WBC # BLD AUTO: 22 K/UL (ref 4.3–11.1)
WBC # BLD AUTO: 22.1 K/UL (ref 4.3–11.1)
WBC # BLD AUTO: 22.8 K/UL (ref 4.3–11.1)
WBC # BLD AUTO: 23.1 K/UL (ref 4.3–11.1)
WBC # BLD AUTO: 23.7 K/UL (ref 4.3–11.1)
WBC # BLD AUTO: 23.9 K/UL (ref 4.3–11.1)
WBC # BLD AUTO: 25.4 K/UL (ref 4.3–11.1)
WBC URNS QL MICRO: ABNORMAL /HPF

## 2020-01-01 PROCEDURE — 0656 HSPC GENERAL INPATIENT

## 2020-01-01 PROCEDURE — 36415 COLL VENOUS BLD VENIPUNCTURE: CPT

## 2020-01-01 PROCEDURE — 87040 BLOOD CULTURE FOR BACTERIA: CPT

## 2020-01-01 PROCEDURE — 85610 PROTHROMBIN TIME: CPT

## 2020-01-01 PROCEDURE — 85025 COMPLETE CBC W/AUTO DIFF WBC: CPT

## 2020-01-01 PROCEDURE — 83735 ASSAY OF MAGNESIUM: CPT

## 2020-01-01 PROCEDURE — P9016 RBC LEUKOCYTES REDUCED: HCPCS

## 2020-01-01 PROCEDURE — 87340 HEPATITIS B SURFACE AG IA: CPT

## 2020-01-01 PROCEDURE — 85730 THROMBOPLASTIN TIME PARTIAL: CPT

## 2020-01-01 PROCEDURE — 86923 COMPATIBILITY TEST ELECTRIC: CPT

## 2020-01-01 PROCEDURE — 80048 BASIC METABOLIC PNL TOTAL CA: CPT

## 2020-01-01 PROCEDURE — 74011250636 HC RX REV CODE- 250/636: Performed by: NURSE PRACTITIONER

## 2020-01-01 PROCEDURE — 74011250636 HC RX REV CODE- 250/636: Performed by: NURSE ANESTHETIST, CERTIFIED REGISTERED

## 2020-01-01 PROCEDURE — 83605 ASSAY OF LACTIC ACID: CPT

## 2020-01-01 PROCEDURE — 80053 COMPREHEN METABOLIC PANEL: CPT

## 2020-01-01 PROCEDURE — 80202 ASSAY OF VANCOMYCIN: CPT

## 2020-01-01 PROCEDURE — 82746 ASSAY OF FOLIC ACID SERUM: CPT

## 2020-01-01 PROCEDURE — 86706 HEP B SURFACE ANTIBODY: CPT

## 2020-01-01 PROCEDURE — 74018 RADEX ABDOMEN 1 VIEW: CPT

## 2020-01-01 PROCEDURE — 93971 EXTREMITY STUDY: CPT

## 2020-01-01 PROCEDURE — 85027 COMPLETE CBC AUTOMATED: CPT

## 2020-01-01 PROCEDURE — 76010000162 HC OR TIME 1.5 TO 2 HR INTENSV-TIER 1: Performed by: SURGERY

## 2020-01-01 PROCEDURE — 71045 X-RAY EXAM CHEST 1 VIEW: CPT

## 2020-01-01 PROCEDURE — 84100 ASSAY OF PHOSPHORUS: CPT

## 2020-01-01 PROCEDURE — 83970 ASSAY OF PARATHORMONE: CPT

## 2020-01-01 PROCEDURE — 87102 FUNGUS ISOLATION CULTURE: CPT

## 2020-01-01 PROCEDURE — 80074 ACUTE HEPATITIS PANEL: CPT

## 2020-01-01 PROCEDURE — 85018 HEMOGLOBIN: CPT

## 2020-01-01 PROCEDURE — 77030002966 HC SUT PDS J&J -A: Performed by: SURGERY

## 2020-01-01 PROCEDURE — 86900 BLOOD TYPING SEROLOGIC ABO: CPT

## 2020-01-01 PROCEDURE — 86704 HEP B CORE ANTIBODY TOTAL: CPT

## 2020-01-01 PROCEDURE — 76210000006 HC OR PH I REC 0.5 TO 1 HR: Performed by: SURGERY

## 2020-01-01 PROCEDURE — 74011000258 HC RX REV CODE- 258

## 2020-01-01 PROCEDURE — 84145 PROCALCITONIN (PCT): CPT

## 2020-01-01 PROCEDURE — 74011000250 HC RX REV CODE- 250: Performed by: SURGERY

## 2020-01-01 PROCEDURE — 77030019895 HC PCKNG STRP IODO -A: Performed by: SURGERY

## 2020-01-01 PROCEDURE — 76210000020 HC REC RM PH II FIRST 0.5 HR: Performed by: SURGERY

## 2020-01-01 PROCEDURE — 74011000250 HC RX REV CODE- 250: Performed by: NURSE ANESTHETIST, CERTIFIED REGISTERED

## 2020-01-01 PROCEDURE — 84436 ASSAY OF TOTAL THYROXINE: CPT

## 2020-01-01 PROCEDURE — 87205 SMEAR GRAM STAIN: CPT

## 2020-01-01 PROCEDURE — 86850 RBC ANTIBODY SCREEN: CPT

## 2020-01-01 PROCEDURE — 77030006674 HC BLD MYRIN GYRS -B: Performed by: SURGERY

## 2020-01-01 PROCEDURE — 84443 ASSAY THYROID STIM HORMONE: CPT

## 2020-01-01 PROCEDURE — 73701 CT LOWER EXTREMITY W/DYE: CPT

## 2020-01-01 PROCEDURE — 76060000034 HC ANESTHESIA 1.5 TO 2 HR: Performed by: SURGERY

## 2020-01-01 PROCEDURE — 74011000250 HC RX REV CODE- 250: Performed by: NURSE PRACTITIONER

## 2020-01-01 PROCEDURE — 89050 BODY FLUID CELL COUNT: CPT

## 2020-01-01 PROCEDURE — 3336500001 HSPC ELECTION

## 2020-01-01 PROCEDURE — 88300 SURGICAL PATH GROSS: CPT

## 2020-01-01 PROCEDURE — 81001 URINALYSIS AUTO W/SCOPE: CPT

## 2020-01-01 PROCEDURE — 82272 OCCULT BLD FECES 1-3 TESTS: CPT

## 2020-01-01 PROCEDURE — 74011000636 HC RX REV CODE- 636

## 2020-01-01 PROCEDURE — 82607 VITAMIN B-12: CPT

## 2020-01-01 PROCEDURE — 82330 ASSAY OF CALCIUM: CPT

## 2020-01-01 PROCEDURE — 87086 URINE CULTURE/COLONY COUNT: CPT

## 2020-01-01 PROCEDURE — 80069 RENAL FUNCTION PANEL: CPT

## 2020-01-01 PROCEDURE — 93978 VASCULAR STUDY: CPT

## 2020-01-01 PROCEDURE — 72193 CT PELVIS W/DYE: CPT

## 2020-01-01 PROCEDURE — 2709999900 HC NON-CHARGEABLE SUPPLY: Performed by: SURGERY

## 2020-01-01 PROCEDURE — 77030031139 HC SUT VCRL2 J&J -A: Performed by: SURGERY

## 2020-01-01 RX ORDER — FACIAL-BODY WIPES
10 EACH TOPICAL AS NEEDED
Status: DISCONTINUED | OUTPATIENT
Start: 2020-01-01 | End: 2020-01-01

## 2020-01-01 RX ORDER — HYDROMORPHONE HYDROCHLORIDE 1 MG/ML
1 INJECTION, SOLUTION INTRAMUSCULAR; INTRAVENOUS; SUBCUTANEOUS
Status: DISCONTINUED | OUTPATIENT
Start: 2020-01-01 | End: 2020-10-14 | Stop reason: HOSPADM

## 2020-01-01 RX ORDER — NALOXONE HYDROCHLORIDE 0.4 MG/ML
0.1 INJECTION, SOLUTION INTRAMUSCULAR; INTRAVENOUS; SUBCUTANEOUS AS NEEDED
Status: DISCONTINUED | OUTPATIENT
Start: 2020-01-01 | End: 2020-01-01 | Stop reason: HOSPADM

## 2020-01-01 RX ORDER — HYDROMORPHONE HYDROCHLORIDE 2 MG/ML
2 INJECTION, SOLUTION INTRAMUSCULAR; INTRAVENOUS; SUBCUTANEOUS
Status: COMPLETED | OUTPATIENT
Start: 2020-01-01 | End: 2020-01-01

## 2020-01-01 RX ORDER — HALOPERIDOL 5 MG/ML
2 INJECTION INTRAMUSCULAR
Status: DISCONTINUED | OUTPATIENT
Start: 2020-01-01 | End: 2020-10-14 | Stop reason: HOSPADM

## 2020-01-01 RX ORDER — SODIUM CHLORIDE 9 MG/ML
INJECTION, SOLUTION INTRAVENOUS
Status: DISCONTINUED | OUTPATIENT
Start: 2020-01-01 | End: 2020-01-01 | Stop reason: HOSPADM

## 2020-01-01 RX ORDER — ACETAMINOPHEN 500 MG
1000 TABLET ORAL ONCE
Status: DISCONTINUED | OUTPATIENT
Start: 2020-01-01 | End: 2020-01-01 | Stop reason: HOSPADM

## 2020-01-01 RX ORDER — SODIUM CHLORIDE 0.9 % (FLUSH) 0.9 %
3 SYRINGE (ML) INJECTION EVERY 12 HOURS
Status: DISCONTINUED | OUTPATIENT
Start: 2020-01-01 | End: 2020-01-01

## 2020-01-01 RX ORDER — HEPARIN 100 UNIT/ML
300 SYRINGE INTRAVENOUS EVERY 12 HOURS
Status: DISCONTINUED | OUTPATIENT
Start: 2020-01-01 | End: 2020-10-14 | Stop reason: HOSPADM

## 2020-01-01 RX ORDER — GLYCOPYRROLATE 0.2 MG/ML
0.2 INJECTION INTRAMUSCULAR; INTRAVENOUS
Status: DISCONTINUED | OUTPATIENT
Start: 2020-01-01 | End: 2020-10-14 | Stop reason: HOSPADM

## 2020-01-01 RX ORDER — MIDAZOLAM HYDROCHLORIDE 1 MG/ML
2 INJECTION, SOLUTION INTRAMUSCULAR; INTRAVENOUS
Status: DISCONTINUED | OUTPATIENT
Start: 2020-01-01 | End: 2020-01-01 | Stop reason: HOSPADM

## 2020-01-01 RX ORDER — MIDAZOLAM HYDROCHLORIDE 1 MG/ML
2 INJECTION, SOLUTION INTRAMUSCULAR; INTRAVENOUS ONCE
Status: DISCONTINUED | OUTPATIENT
Start: 2020-01-01 | End: 2020-01-01 | Stop reason: HOSPADM

## 2020-01-01 RX ORDER — HYDROMORPHONE HYDROCHLORIDE 2 MG/ML
2 INJECTION, SOLUTION INTRAMUSCULAR; INTRAVENOUS; SUBCUTANEOUS EVERY 4 HOURS
Status: DISCONTINUED | OUTPATIENT
Start: 2020-01-01 | End: 2020-10-14 | Stop reason: HOSPADM

## 2020-01-01 RX ORDER — OXYCODONE HYDROCHLORIDE 5 MG/1
10 TABLET ORAL
Status: DISCONTINUED | OUTPATIENT
Start: 2020-01-01 | End: 2020-01-01 | Stop reason: HOSPADM

## 2020-01-01 RX ORDER — ONDANSETRON 2 MG/ML
4 INJECTION INTRAMUSCULAR; INTRAVENOUS ONCE
Status: DISCONTINUED | OUTPATIENT
Start: 2020-01-01 | End: 2020-01-01 | Stop reason: HOSPADM

## 2020-01-01 RX ORDER — HEPARIN 100 UNIT/ML
300 SYRINGE INTRAVENOUS AS NEEDED
Status: DISCONTINUED | OUTPATIENT
Start: 2020-01-01 | End: 2020-10-14 | Stop reason: HOSPADM

## 2020-01-01 RX ORDER — SODIUM CHLORIDE, SODIUM LACTATE, POTASSIUM CHLORIDE, CALCIUM CHLORIDE 600; 310; 30; 20 MG/100ML; MG/100ML; MG/100ML; MG/100ML
75 INJECTION, SOLUTION INTRAVENOUS CONTINUOUS
Status: DISCONTINUED | OUTPATIENT
Start: 2020-01-01 | End: 2020-01-01 | Stop reason: HOSPADM

## 2020-01-01 RX ORDER — SODIUM CHLORIDE 0.9 % (FLUSH) 0.9 %
3 SYRINGE (ML) INJECTION AS NEEDED
Status: DISCONTINUED | OUTPATIENT
Start: 2020-01-01 | End: 2020-01-01

## 2020-01-01 RX ORDER — FENTANYL CITRATE 50 UG/ML
INJECTION, SOLUTION INTRAMUSCULAR; INTRAVENOUS AS NEEDED
Status: DISCONTINUED | OUTPATIENT
Start: 2020-01-01 | End: 2020-01-01 | Stop reason: HOSPADM

## 2020-01-01 RX ORDER — SODIUM CHLORIDE 0.9 % (FLUSH) 0.9 %
10 SYRINGE (ML) INJECTION
Status: COMPLETED | OUTPATIENT
Start: 2020-01-01 | End: 2020-01-01

## 2020-01-01 RX ORDER — DIPHENHYDRAMINE HYDROCHLORIDE 50 MG/ML
12.5 INJECTION, SOLUTION INTRAMUSCULAR; INTRAVENOUS ONCE
Status: DISCONTINUED | OUTPATIENT
Start: 2020-01-01 | End: 2020-01-01 | Stop reason: HOSPADM

## 2020-01-01 RX ORDER — PROPOFOL 10 MG/ML
INJECTION, EMULSION INTRAVENOUS AS NEEDED
Status: DISCONTINUED | OUTPATIENT
Start: 2020-01-01 | End: 2020-01-01 | Stop reason: HOSPADM

## 2020-01-01 RX ORDER — LORAZEPAM 2 MG/ML
2 INJECTION INTRAMUSCULAR
Status: COMPLETED | OUTPATIENT
Start: 2020-01-01 | End: 2020-01-01

## 2020-01-01 RX ORDER — SODIUM CHLORIDE, SODIUM LACTATE, POTASSIUM CHLORIDE, CALCIUM CHLORIDE 600; 310; 30; 20 MG/100ML; MG/100ML; MG/100ML; MG/100ML
100 INJECTION, SOLUTION INTRAVENOUS CONTINUOUS
Status: DISCONTINUED | OUTPATIENT
Start: 2020-01-01 | End: 2020-01-01 | Stop reason: HOSPADM

## 2020-01-01 RX ORDER — HYDROMORPHONE HYDROCHLORIDE 2 MG/ML
0.5 INJECTION, SOLUTION INTRAMUSCULAR; INTRAVENOUS; SUBCUTANEOUS
Status: DISCONTINUED | OUTPATIENT
Start: 2020-01-01 | End: 2020-01-01 | Stop reason: HOSPADM

## 2020-01-01 RX ORDER — KETAMINE HYDROCHLORIDE 50 MG/ML
INJECTION, SOLUTION INTRAMUSCULAR; INTRAVENOUS AS NEEDED
Status: DISCONTINUED | OUTPATIENT
Start: 2020-01-01 | End: 2020-01-01 | Stop reason: HOSPADM

## 2020-01-01 RX ORDER — SODIUM CHLORIDE 0.9 % (FLUSH) 0.9 %
10 SYRINGE (ML) INJECTION AS NEEDED
Status: DISCONTINUED | OUTPATIENT
Start: 2020-01-01 | End: 2020-10-14 | Stop reason: HOSPADM

## 2020-01-01 RX ORDER — DIPHENHYDRAMINE HYDROCHLORIDE 50 MG/ML
25 INJECTION, SOLUTION INTRAMUSCULAR; INTRAVENOUS
Status: DISCONTINUED | OUTPATIENT
Start: 2020-01-01 | End: 2020-10-14 | Stop reason: HOSPADM

## 2020-01-01 RX ORDER — PROPOFOL 10 MG/ML
INJECTION, EMULSION INTRAVENOUS
Status: DISCONTINUED | OUTPATIENT
Start: 2020-01-01 | End: 2020-01-01 | Stop reason: HOSPADM

## 2020-01-01 RX ORDER — BUPIVACAINE HYDROCHLORIDE AND EPINEPHRINE 2.5; 5 MG/ML; UG/ML
INJECTION, SOLUTION EPIDURAL; INFILTRATION; INTRACAUDAL; PERINEURAL AS NEEDED
Status: DISCONTINUED | OUTPATIENT
Start: 2020-01-01 | End: 2020-01-01 | Stop reason: HOSPADM

## 2020-01-01 RX ORDER — OXYCODONE HYDROCHLORIDE 5 MG/1
5 TABLET ORAL
Status: DISCONTINUED | OUTPATIENT
Start: 2020-01-01 | End: 2020-01-01 | Stop reason: HOSPADM

## 2020-01-01 RX ORDER — LORAZEPAM 2 MG/ML
1 INJECTION INTRAMUSCULAR
Status: DISCONTINUED | OUTPATIENT
Start: 2020-01-01 | End: 2020-10-14 | Stop reason: HOSPADM

## 2020-01-01 RX ORDER — SODIUM CHLORIDE 0.9 % (FLUSH) 0.9 %
10 SYRINGE (ML) INJECTION EVERY 12 HOURS
Status: DISCONTINUED | OUTPATIENT
Start: 2020-01-01 | End: 2020-10-14 | Stop reason: HOSPADM

## 2020-01-01 RX ORDER — FENTANYL CITRATE 50 UG/ML
100 INJECTION, SOLUTION INTRAMUSCULAR; INTRAVENOUS ONCE
Status: DISCONTINUED | OUTPATIENT
Start: 2020-01-01 | End: 2020-01-01 | Stop reason: HOSPADM

## 2020-01-01 RX ORDER — LIDOCAINE HYDROCHLORIDE 10 MG/ML
0.1 INJECTION INFILTRATION; PERINEURAL AS NEEDED
Status: DISCONTINUED | OUTPATIENT
Start: 2020-01-01 | End: 2020-01-01 | Stop reason: HOSPADM

## 2020-01-01 RX ORDER — ACETAMINOPHEN 650 MG/1
650 SUPPOSITORY RECTAL
Status: DISCONTINUED | OUTPATIENT
Start: 2020-01-01 | End: 2020-01-01

## 2020-01-01 RX ADMIN — KETAMINE HYDROCHLORIDE 10 MG: 50 INJECTION INTRAMUSCULAR; INTRAVENOUS at 10:11

## 2020-01-01 RX ADMIN — HYDROMORPHONE HYDROCHLORIDE 2 MG: 2 INJECTION INTRAMUSCULAR; INTRAVENOUS; SUBCUTANEOUS at 20:20

## 2020-01-01 RX ADMIN — FENTANYL CITRATE 25 MCG: 50 INJECTION INTRAMUSCULAR; INTRAVENOUS at 10:07

## 2020-01-01 RX ADMIN — PROPOFOL 75 MCG/KG/MIN: 10 INJECTION, EMULSION INTRAVENOUS at 10:11

## 2020-01-01 RX ADMIN — HYDROMORPHONE HYDROCHLORIDE 2 MG: 2 INJECTION INTRAMUSCULAR; INTRAVENOUS; SUBCUTANEOUS at 17:02

## 2020-01-01 RX ADMIN — KETAMINE HYDROCHLORIDE 10 MG: 50 INJECTION INTRAMUSCULAR; INTRAVENOUS at 10:13

## 2020-01-01 RX ADMIN — FENTANYL CITRATE 25 MCG: 50 INJECTION INTRAMUSCULAR; INTRAVENOUS at 10:49

## 2020-01-01 RX ADMIN — FENTANYL CITRATE 25 MCG: 50 INJECTION INTRAMUSCULAR; INTRAVENOUS at 10:08

## 2020-01-01 RX ADMIN — PROPOFOL 30 MG: 10 INJECTION, EMULSION INTRAVENOUS at 10:11

## 2020-01-01 RX ADMIN — SODIUM CHLORIDE: 9 INJECTION, SOLUTION INTRAVENOUS at 10:00

## 2020-01-01 RX ADMIN — SODIUM CHLORIDE, PRESERVATIVE FREE 3 ML: 5 INJECTION INTRAVENOUS at 17:03

## 2020-01-01 RX ADMIN — PROPOFOL 30 MG: 10 INJECTION, EMULSION INTRAVENOUS at 10:07

## 2020-01-01 RX ADMIN — LORAZEPAM 2 MG: 2 INJECTION INTRAMUSCULAR; INTRAVENOUS at 17:02

## 2020-01-01 RX ADMIN — SODIUM CHLORIDE 100 ML: 900 INJECTION, SOLUTION INTRAVENOUS at 15:54

## 2020-01-01 RX ADMIN — FENTANYL CITRATE 25 MCG: 50 INJECTION INTRAMUSCULAR; INTRAVENOUS at 10:13

## 2020-01-01 RX ADMIN — IOPAMIDOL 100 ML: 755 INJECTION, SOLUTION INTRAVENOUS at 15:54

## 2020-01-01 RX ADMIN — PHENYLEPHRINE HYDROCHLORIDE 50 MCG: 10 INJECTION INTRAVENOUS at 11:01

## 2020-01-01 RX ADMIN — Medication 10 ML: at 15:54

## 2020-01-01 RX ADMIN — PHENYLEPHRINE HYDROCHLORIDE 50 MCG: 10 INJECTION INTRAVENOUS at 11:00

## 2020-09-17 PROBLEM — N18.6 ESRD (END STAGE RENAL DISEASE) ON DIALYSIS (HCC): Status: ACTIVE | Noted: 2020-01-01

## 2020-09-17 PROBLEM — Z99.2 ESRD (END STAGE RENAL DISEASE) ON DIALYSIS (HCC): Status: ACTIVE | Noted: 2020-01-01

## 2020-09-18 PROBLEM — T85.71XA PERITONEAL DIALYSIS CATHETER INFECTION (HCC): Status: ACTIVE | Noted: 2020-01-01

## 2020-09-18 NOTE — ANESTHESIA POSTPROCEDURE EVALUATION
Procedure(s): PERITONEAL DIALYSIS CATHETER REMOVAL. general 
 
Anesthesia Post Evaluation Multimodal analgesia: multimodal analgesia used between 6 hours prior to anesthesia start to PACU discharge Patient location during evaluation: bedside Patient participation: complete - patient participated Level of consciousness: responsive to verbal stimuli Pain management: adequate Airway patency: patent Anesthetic complications: no 
Cardiovascular status: hemodynamically stable Respiratory status: spontaneous ventilation Hydration status: stable INITIAL Post-op Vital signs:  
Vitals Value Taken Time /52 9/18/2020 12:24 PM  
Temp 36.8 °C (98.2 °F) 9/18/2020 12:21 PM  
Pulse 94 9/18/2020 12:24 PM  
Resp 18 9/18/2020 12:24 PM  
SpO2 100 % 9/18/2020 12:24 PM

## 2020-09-18 NOTE — PERIOP NOTES
TRANSFER - OUT REPORT: 
 
Verbal report given to Gonzalo Mckee RN on Estela  being transferred to  for routine post - op Report consisted of patients Situation, Background, Assessment and  
Recommendations(SBAR). Information from the following report(s) SBAR, OR Summary, Procedure Summary, Intake/Output and MAR was reviewed with the receiving nurse. Lines:  
Peripheral IV 09/10/20 Left Antecubital (Active) Site Assessment Clean, dry, & intact 09/18/20 1150 Phlebitis Assessment 0 09/18/20 1150 Infiltration Assessment 0 09/18/20 1150 Dressing Status Clean, dry, & intact; Occlusive 09/18/20 1150 Dressing Type Transparent;Tape 09/18/20 1150 Opportunity for questions and clarification was provided. Patient transported with: 
 O2 @ 4 liters VTE prophylaxis orders have been written for [place]. Patient and family given floor number and nurses name. Family updated re: pt status after security code verified.

## 2020-09-18 NOTE — BRIEF OP NOTE
Brief Postoperative Note Patient: Kenda Severe YOB: 1965 MRN: [id number] Date of Procedure: 9/18/2020 Pre-Op Diagnosis: infected pd cath Post-Op Diagnosis: Same as preoperative diagnosis. Procedure(s): PERITONEAL DIALYSIS CATHETER REMOVAL Surgeon(s): 
Madan Heller MD 
 
Surgical Assistant: None Anesthesia: General  
 
Estimated Blood Loss (mL): Minimal 
 
Complications: None Specimens:  
ID Type Source Tests Collected by Time Destination 1 : peritoneal dialysis catheter  Other Petra Rangel MD 9/18/2020 1100 Pathology Implants: * No implants in log * Drains: * No LDAs found * Findings: tunneled cath removed. Midline fascial defect closed with 0 PDS suture. Loose closure of counter incisions. Exit site packed with 1/2\" iodoform guaze.  
 
Electronically Signed by Blaine Haas MD on 9/18/2020 at 11:18 AM

## 2020-09-18 NOTE — ANESTHESIA PREPROCEDURE EVALUATION
Relevant Problems No relevant active problems Anesthetic History Review of Systems / Medical History Nursing notes reviewed Pulmonary Neuro/Psych Neuromuscular disease (Fatigue and decreased strength) Cardiovascular Hypertension Hyperlipidemia Exercise tolerance: <4 METS 
  
GI/Hepatic/Renal 
  
 
 
Renal disease: ESRD and dialysis Endo/Other Other Findings Comments: Calciphylaxis Physical Exam 
 
Airway Comments: Unable to obtain Cardiovascular Rhythm: regular Rate: normal 
 
 
 
 Dental 
 
 
  
Pulmonary Breath sounds clear to auscultation Abdominal 
 
 
 
 Other Findings Anesthetic Plan ASA: 4 Anesthesia type: total IV anesthesia and general - backup Induction: Intravenous Patient essentially obtunded. All history obtained from notes/nursing interview. Last dialysis 9/16. Anemic this AM (Hgb 6.6) - transfused 1U PRBC. U/S of aorta obtained to rule out dissection? 9/18: Hgb 7.3 following transfusion. Concern over decreased strength and post operative ventilation. Will discuss case with Dr. Srinath Pina

## 2020-09-18 NOTE — H&P
[address] [facility], [address] Jed [place], [address] [facility] 
[phone] H&P/Consult Note Virginia Benitez   MRN: [id number]     : 1965 HPI: Virginia Benitez is a 62 y.o. female who is seen at request of [facility] for peritoneal catheter removal.  It is no longer in use. She is on HD. Catheter is felt to be infected. No past medical history on file. No past surgical history on file. No current facility-administered medications for this encounter. No current outpatient medications on file. ALLERGIES:  Patient has no allergy information on record. Social History Socioeconomic History  Marital status: SINGLE Spouse name: Not on file  Number of children: Not on file  Years of education: Not on file  Highest education level: Not on file Social History Tobacco Use Smoking Status Not on file No family history on file. ROS: The patient has no difficulty with chest pain or shortness of breath. No fever or chills. The patient denies any personal or family history of abnormal clotting or bleeding. Comprehensive review of systems was otherwise unremarkable except as noted above. Physical Exam:  
Constitutional: Alert oriented cooperative patient in no acute distress. There were no vitals taken for this visit. Eyes:Sclera are clear without icterus. ENMT: no obvious neck masses, no ear or lip lesions CV: RRR. Normal perfusion. Tunneled RIJ CVL Resp: No JVD. Breathing is  non-labored. GI: obese, edematous, soft and non-distended, PD cath in place Musculoskeletal: lying in bed with limited movement. Neuro:  No obvious focal deficits Psychiatric: arousable, normal affect and mood, no memory impairment Lab Results Component Value Date/Time  WBC 23.9 (H) 2020 08:39 AM  
 HGB 6.6 (LL) 2020 08:39 AM  
 HCT 19.4 (LL) 2020 08:39 AM  
 PLATELET 582 50/39/8306 08:39 AM  
 MCV 91.5 09/17/2020 08:39 AM  
 
 
Lab Results Component Value Date/Time Sodium 135 (L) 09/16/2020 06:41 AM  
 Potassium 4.3 09/16/2020 06:41 AM  
 Chloride 92 (L) 09/16/2020 06:41 AM  
 CO2 21 09/16/2020 06:41 AM  
 BUN 55 (H) 09/16/2020 06:41 AM  
 Creatinine 3.73 (H) 09/16/2020 06:41 AM  
 Glucose 262 (H) 09/16/2020 06:41 AM  
 INR 1.5 09/14/2020 08:56 AM  
 aPTT 119.7 (H) 09/09/2020 01:09 AM  
 Bilirubin, total 0.6 09/14/2020 08:56 AM  
 ALT (SGPT) 21 09/14/2020 08:56 AM  
 Alk. phosphatase 150 (H) 09/14/2020 08:56 AM  
 
 
Assessment/Plan:     Elidia Mccann is a 62 y.o. female who presents to [facility] from [facility] for removal of PD catheter. She was able to converse with me and agreed to proceed with Peritoneal Dialysis Catheter removal.   I discussed the patient's condition and treatment options with the patient. I discussed risks of surgery in language the patient could understand including bleeding, infection, need for further surgery, abscess, fistula, SBO, DVT, PE, heart attack, stroke, renal failure, respiratory failure, ventilatory dependence, and death. The patient voiced understanding of all this and all questions were answered. Alternatives to surgery were discussed also and risks of the alternatives. The patient requested that we proceed with surgery. Informed consent was obtained. Problem List  Date Reviewed: 9/18/2020 Codes Class Noted * (Principal) Peritoneal dialysis catheter [facility] ICD-10-CM: T85.71XA ICD-9-CM: 996.68  9/18/2020 ESRD (end stage renal disease) on dialysis ([facility] ICD-10-CM: N18.6, Z99.2 ICD-9-CM: 585.6, V45.11  9/17/2020 Ricky Sutton MD,  FACS

## 2020-09-18 NOTE — PERIOP NOTES
Patient weak and unable to sign name. Patient was able to make an attempt at a signature. Witnessed and confirmed with [employer] at bedside. Patient A/O x 4 and able to identify planned procedure. Name and date of birth verified by [employer] and myself with patient bracelet.

## 2020-09-25 NOTE — OP NOTES
[address] 
[phone] OPERATVE REPORT Name: Mendel Dame     Date of Surgery: 9/18/2020 Med Record Number: [id number] Age: 62 y.o. Sex: female Pre-operative Diagnosis: infected pd cath Post-operative Diagnosis: Same as preoperative diagnosis. Procedure: PERITONEAL DIALYSIS CATHETER REMOVAL [cpt 01275] Surgeon: Elsa Capps MD 
Asistants: none Anesthesia:  General 
Complications: none Estimated Blood Loss (mL): Minimal 
Specimen:  
ID Type Source Tests Collected by Time Destination 1 : peritoneal dialysis catheter  Other Abdomen   Katherine Hauser MD 9/18/2020 1100 Pathology Implants: * No implants in log * Drains: * No LDAs found * 
  
Findings: tunneled cath removed. Midline fascial defect closed with 0 PDS suture. Loose closure of counter incisions. Exit site packed with 1/2\" iodoform guaze. Procedure Description: The risks, benefits, potential complications, treatment options, and expected outcomes were discussed with the patient pre-operatively. The patient voiced understanding and gave informed consent preoperatively. The patient was taken to the Operating Room, and the [facility] time-out protocol checklist was followed. After the induction of adequate sedation by anesthesia, the abdomen was prepped and draped in the usual sterile fashion. Preoperative antibiotics were given. The peritoneal catheter had been disconnected and capped. Exit site was to the right of midline. The area around the exit site cuff was infiltrated with 0.25% bupivacaine with epinephrine. The cuff was dissected free and the sheath incised beyond the cuff creating mobility. A counterincision was made supraumbilical midline where the catheter was felt to enter the abdominal wall.   The tubing was encircled at this point and advanced from the exit site to this portion including a connector in the tubing. The second cuff was not present at the site and actually traversed around the umbilicus to an infraumbilical point where the entrance into the peritoneal cavity was suspected. An additional counterincision was made over the site and the tubing was manipulated around to the infraumbilical incision. A droplet of fluid within the tubing splashed into my eye. I broke from the field to lavage my right eye. I returned to the operating field and continued dissection at the fascial level. A spherical plastic portion of the catheter and not a second cuff was present beneath the fascia. This was freed and the catheter was completely removed and sent to pathology for gross inspection. The tip was complete. There was no purulent fluid. There was no fibrinous occlusion. The fascial defect was closed with interrupted 0 PDS sutures. Subcutaneous tissues were irrigated. The counter incision sites were closed loosely with interrupted subcuticular 4-0 Monocryl suture. The exit site was packed with 1/2 inch iodoform gauze. Sterile dressings were applied. All sponge, instruments, and needle counts were correct. The patient was taken to PACU in good condition.  
 
Tomasa Curry MD, FACS

## 2020-10-13 PROBLEM — E83.59 CALCIPHYLAXIS: Status: ACTIVE | Noted: 2020-01-01

## 2020-10-13 NOTE — [facility] IDG NURSE NOTES
Patient: Shayy Blank Date: 10/13/20 Time: 6:34 PM 
 
[facility] Nurse Notes Pt arrived via EMS. Pt eyes open but non-verbal. Pt on 3 liters nasal cannula. Pt has a midline picc to left arm and a perm a cath to her right chest. Per daughter pt has not eaten, drank or voided since last Thursday since dialysis. Pt has massive skin erosion, sloughing, and dead tissue  to BLE, sacrum, rakel- area, back, and heels. Pt also has a unknown abdominal surgical wound. All dressing removed and replaced with chucks pad to absorb the serosanguinous, and foul smelling drainage. Daughter arrived shortly after pt. Daughter seems to be in some denial as she was planning on going to work. Psychosocial/Family Information if needed to share:   is from [place] and pt was sent to [facility] 2 months ago form [facility].  lives 3+ hours away and is enroute but 2 very involved daughters live nearby. Spiritual assessment of semi/un responsive patient:  TBD On 10/13/2020 Dr. Melania Peck verbally certified that Shayy Blank is terminally ill with a life expectancy of 6 months or less if the terminal illness runs its normal course. Verbal certification received by: Mary Cantu RN  on 10/13/2020 @ 1120  . Hospice Dx:   Calciphylaxis. Associated Dx:  Complex open skin lesions on abdomen, buttocks and limbs, extreme pain, failure of peritoneal dialysis, anemia of renal failure, ME, cessation of HD, protein calorie malnutrition, and short term tube feeding. Non Associated Dx: HTN, DM two, prior DVT, and neuropathy. Signed by:  Leighton Tran RN

## 2020-10-13 NOTE — H&P
History and Physical 
 
Patient: Amor Garcia MRN: [id number]  SSN: [id number] YOB: 1965  Age: 62 y.o. Sex: female Subjective:  
  
Amor Garcia is a 62 y.o. female who has a hospice diagnosis of calciphylaxis. Hospice associated diagnoses are complex open skin lesions on abdomen, buttocks and limbs, extreme pain, failure of peritoneal dialysis, anemia of renal failure, metabolic encephalopathy, cessation of hemodialysis, protein calorie malnutrition, and short term tube feeding. Non-associated diagnoses are hypertension, type II diabetes mellitus, history of DVT, and neuropathy. She had been on peritoneal dialysis for 6 year history of renal failure requiring dialysis. Her peritoneal dialysis catheter became infected and was removed. She developed calciphylaxis and had extensive wounds. She was admitted to [facility] for extended antibiotic therapy and hemodialysis after placement of subclavian hemodialysis catheter. Unfortunately, she became obtunded and her wounds became significantly worse. Due to her continued decline, her family has elected to forgo further medical treatment and pursue comfort measures with hospice care. Without further treatment, her life expectancy is less than 3 days. Patient admitted GIP with calciphylaxis for management of pain, dyspnea, agitation and wound care. Past Medical History:  
Diagnosis Date  Benign hypertension 10/18/2020  Deep vein thrombosis (DVT) of lower extremity associated with intravenous drug use ([address] 10/18/2020  Hypercholesterolemia 10/18/2020  Polyp of colon 10/18/2020 Past Surgical History:  
Procedure Laterality Date  HX CERVICAL FUSION    
 HX COLONOSCOPY    
 HX OTHER SURGICAL    
 peritoneal dialysis catheter  IR INSERT NON TUNL CVC OVER 5 YRS Family History Problem Relation Age of Onset  No Known Problems Other Social History Tobacco Use  
  Smoking status: Never Smoker  Smokeless tobacco: Never Used Substance Use Topics  Alcohol use: Not Currently Prior to Admission medications Medication Sig Start Date End Date Taking? Authorizing Provider  
metoprolol tartrate (Lopressor) 100 mg IR tablet Take 100 mg by mouth two (2) times a day. 12/5/19  Yes Provider, Historical  
HYDROmorphone (DILAUDID) 4 mg tablet Take 4 mg by mouth every four (4) hours as needed for Pain. 8/5/20   Provider, Historical  
losartan (COZAAR) 50 mg tablet Take 50 mg by mouth daily. 9/3/20   Provider, Historical  
mirtazapine (REMERON) 15 mg tablet Take 15 mg by mouth nightly. 9/3/20   Provider, Historical  
polyethylene glycol (MIRALAX) 17 gram packet Take 1 Packet by mouth daily. 8/5/20   Provider, Historical  
sodium bicarbonate 650 mg tablet Take 1,300 mg by mouth three (3) times daily. 8/5/20   Provider, Historical  
  
 
Allergies Allergen Reactions  Percocet [Oxycodone-Acetaminophen] Unknown (comments) Review of Systems: 
Review of systems not obtained due to patient factors. Objective:  
 
Vitals:  
 10/13/20 1740 BP: 118/68 Pulse: (!) 111 Temp: 97.6 °F (36.4 °C) Physical Exam: 
GENERAL: severe distress, appears older than stated age, toxic, morbidly obese LUNG: Coarse breath sounds with labored respirations. HEART: regular rate and rhythm ABDOMEN: soft, non-tender, obese. Bowel sounds hypoactive. : Anuric EXTREMITIES:  extremities with no cyanosis or edema. Weak pulses. SKIN: Extensive wounds on her back, sacrum, buttocks, hips and bilateral lower ext in various stages. Some are necrotic with foul odor. Please see photos in chart. Left upper arm midline is not functional. Right subclavian dialysis catheter with dressing intact. NEUROLOGIC: Lethargic. Agitated. Nonverbal. Profound weakness. Bedbound. PSYCHIATRIC: agitated Assessment:  
 
Hospital Problems  Date Reviewed: 10/18/2020 Codes Class Noted POA Diabetes mellitus ([place] [address] ICD-10-CM: E11.9 ICD-9-CM: 250.00  10/18/2020 Yes * (Principal) Calciphylaxis ICD-10-CM: E83.59 
ICD-9-CM: 275.49  10/13/2020 Unknown Peritoneal dialysis catheter infection ([place] [address] ICD-10-CM: T85.71XA ICD-9-CM: 996.68  9/18/2020 Yes ESRD (end stage renal disease) on dialysis ([place] [address] ICD-10-CM: N18.6, Z99.2 ICD-9-CM: 585.6, V45.11  9/17/2020 Yes Plan:  
 
Current Facility-Administered Medications Medication Dose Route Frequency  haloperidol lactate (HALDOL) injection 2 mg  2 mg SubCUTAneous Q1H PRN Or  
 haloperidol lactate (HALDOL) injection 2 mg  2 mg IntraVENous Q1H PRN  
 haloperidol lactate (HALDOL) injection 2 mg  2 mg SubCUTAneous Q1H PRN Or  
 haloperidol lactate (HALDOL) injection 2 mg  2 mg IntraVENous Q1H PRN  
 glycopyrrolate (ROBINUL) injection 0.2 mg  0.2 mg IntraVENous Q4H PRN  
 HYDROmorphone (PF) (DILAUDID) injection 1 mg  1 mg SubCUTAneous Q30MIN PRN Or  
 HYDROmorphone (PF) (DILAUDID) injection 1 mg  1 mg IntraVENous Q30MIN PRN  
 HYDROmorphone (PF) (DILAUDID) injection 2 mg  2 mg IntraVENous Q4H  
 sodium chloride (NS) flush 10 mL  10 mL InterCATHeter PRN  
 heparin (porcine) pf 300 Units  300 Units InterCATHeter PRN  
 sodium chloride (NS) flush 10 mL  10 mL InterCATHeter Q12H  
 heparin (porcine) pf 300 Units  300 Units InterCATHeter Q12H  
 LORazepam (ATIVAN) injection 1 mg  1 mg IntraVENous Q4H PRN  
 diphenhydrAMINE (BENADRYL) injection 25 mg  25 mg IntraVENous Q4H PRN  
 
 
10/13: ([facility]) Admitted GIP with calciphylaxis for management of pain, dyspnea, agitation and wound care. 1. Pain: Hydromorphone 2mg IV Q4 and 1mg IV/SQ Q30 minutes as needed. 2. Dyspnea: Hydromorphone 1mg IV/SQ Q30 minutes as needed. Glycopyrrolate 0.2mg q4 prn secretions. Oxygen prn. 
 
3. Agitation: Haloperidol 2mg IV/SQ Q1 hour prn and Lorazepam 1mg IV/IM q4 hours prn. 4. Wound Care: Turn and reposition Q2 hours and prn. Wound care as ordered. Air mattress in place for pressure reduction. 5. Family/Pt Support: Family at bedside during exam. Medications and plan of care discussed with nursing staff and family. Will continue to monitor for symptoms and adjust medications as needed to maintain patient comfort. PPS 10%. Case discussed with Dr. Vini Cohen. Addendum: Patient passed at 2117 this evening with her daughter at bedside. Signed By: Cuauhtemoc Pate NP October 13, 2020

## 2020-10-13 NOTE — PROGRESS NOTES
Photos of unstageable wounds to back, sacrum, buttocks, hips and bilateral lower ext. Caused by calciphylaxis.

## 2020-10-14 NOTE — PROGRESS NOTES
:  Report received from [facility], [facility]. Care assumed and pt identified by name and . Pt resting in bed with shallow respirations, eyes opened, but not tracking. No distress observed or voiced at this time and no s/sx of agitation, anxiety, pain, dyspnea, sob, n/v or seizures. FLACC score of 0/10. Bed low and locked, siderails x2, call light within pt's reach. Tab alert on and attached to pt. CNA operating under RN supervision. General:   Obtunded. Appears older than stated age. Eyes:  Sclera anicteric. PERRL Mouth/Throat: Mucous membranes dry. Lungs:   Shallow, irregular breaths, coarse lung sounds bilaterally CV:  S1, S2, no murmurs or rubs. Abdomen:   Distended, wound noted. Extremities: Warm, dry, edema to ble. Skin: Skin color normal, Profuse wounds on ble, right hip, and buttocks. Lines/Devices:  Intact, no erythema, drainage or tenderness from right subclavian dialysis catheter 2018:  Pt resting in bed with second daughter Aurea Madrigal) at bedside. Pt's eyes are open, but she does not track, blink or follow any visual stimuli or commands. Breathing is shallow and mildly tachypneic. Scheduled Dilaudid 2mg given through patent dialysis cath. :  Libby Howard entered room in response to call light. Daughter Aurea Madrigal) stated that she thinks \"she's not breathing anymore. \"  No chest rise and fall noted, and pt is pale without movement. No lung or heart sounds auscultated. Pupils are dilated and fixed. Death time .   
 
:  home contacted [facility] in [place], [place]). Notified that Andreina Gómez and Vanessa Medina would  pt. Family has also arrived from out of town. :  Andreina Gómez and Vanessa Medina here to  body.

## 2020-10-15 LAB
FUNGUS CULTURE, RFCO2T: NEGATIVE
FUNGUS SMEAR, RFCO1T: NORMAL
FUNGUS SPEC CULT: NORMAL
FUNGUS STAIN, 188244: NORMAL
REFLEX TO ID, RFCO3T: NORMAL
SPECIMEN SOURCE: NORMAL
SPECIMEN SOURCE: NORMAL

## 2020-10-18 PROBLEM — E11.9 DIABETES MELLITUS (HCC): Status: ACTIVE | Noted: 2020-10-18

## 2020-10-18 PROBLEM — K63.5 POLYP OF COLON: Status: ACTIVE | Noted: 2020-10-18

## 2020-10-18 PROBLEM — E78.00 HYPERCHOLESTEROLEMIA: Status: ACTIVE | Noted: 2020-10-18

## 2020-10-18 PROBLEM — I10 BENIGN HYPERTENSION: Status: ACTIVE | Noted: 2020-10-18

## 2020-10-18 PROBLEM — I82.409: Status: ACTIVE | Noted: 2020-10-18

## 2020-10-18 RX ORDER — POLYETHYLENE GLYCOL 3350 17 G/17G
1 POWDER, FOR SOLUTION ORAL DAILY
COMMUNITY
Start: 2020-01-01

## 2020-10-18 RX ORDER — LOSARTAN POTASSIUM 50 MG/1
50 TABLET ORAL DAILY
COMMUNITY
Start: 2020-01-01

## 2020-10-18 RX ORDER — SODIUM BICARBONATE 650 MG/1
1300 TABLET ORAL 3 TIMES DAILY
COMMUNITY
Start: 2020-01-01

## 2020-10-18 RX ORDER — METOPROLOL TARTRATE 100 MG/1
100 TABLET ORAL 2 TIMES DAILY
COMMUNITY
Start: 2019-01-01

## 2020-10-18 RX ORDER — HYDROMORPHONE HYDROCHLORIDE 4 MG/1
4 TABLET ORAL
COMMUNITY
Start: 2020-01-01

## 2020-10-18 RX ORDER — MIRTAZAPINE 15 MG/1
15 TABLET, FILM COATED ORAL
COMMUNITY
Start: 2020-01-01

## (undated) DEVICE — BLADE SCALPEL NO 15 STERILE

## (undated) DEVICE — GAUZE,SPONGE,2"X2",8PLY,STERILE,LF,2'S: Brand: MEDLINE

## (undated) DEVICE — TRAY PREP DRY W/ PREM GLV 2 APPL 6 SPNG 2 UNDPD 1 OVERWRAP

## (undated) DEVICE — NEEDLE HYPO 21GA L1.5IN INTRAMUSCULAR S STL LATCH BVL UP

## (undated) DEVICE — PAD ELECTRD AD CRDLSS GRND POLYHESIVE DISP REM

## (undated) DEVICE — 3M™ TEGADERM™ TRANSPARENT FILM DRESSING FRAME STYLE, 1624W, 2-3/8 IN X 2-3/4 IN (6 CM X 7 CM), 100/CT 4CT/CASE: Brand: 3M™ TEGADERM™

## (undated) DEVICE — SUTURE PDS II SZ 0 L27IN ABSRB VLT L26MM CT-2 1/2 CIR Z334H

## (undated) DEVICE — SUTURE VCRL SZ 4-0 L27IN ABSRB UD L19MM PS-2 3/8 CIR PRIM J426H

## (undated) DEVICE — GAUZE,PACKING STRIP,IODOFORM,1/2"X5YD,ST: Brand: CURAD

## (undated) DEVICE — MINOR SPLIT GENERAL: Brand: MEDLINE INDUSTRIES, INC.

## (undated) DEVICE — GOWN,BREATHABLE SLV,AURORA,LG,STRL: Brand: MEDLINE

## (undated) DEVICE — SHEET, T, LAPAROTOMY, STERILE: Brand: MEDLINE